# Patient Record
Sex: FEMALE | Race: WHITE | HISPANIC OR LATINO | URBAN - METROPOLITAN AREA
[De-identification: names, ages, dates, MRNs, and addresses within clinical notes are randomized per-mention and may not be internally consistent; named-entity substitution may affect disease eponyms.]

---

## 2017-02-18 ENCOUNTER — EMERGENCY (EMERGENCY)
Facility: HOSPITAL | Age: 37
LOS: 0 days | Discharge: HOME | End: 2017-02-19
Admitting: FAMILY MEDICINE

## 2017-06-27 DIAGNOSIS — F60.0 PARANOID PERSONALITY DISORDER: ICD-10-CM

## 2017-06-27 DIAGNOSIS — Z79.899 OTHER LONG TERM (CURRENT) DRUG THERAPY: ICD-10-CM

## 2017-06-27 DIAGNOSIS — R45.851 SUICIDAL IDEATIONS: ICD-10-CM

## 2017-06-27 DIAGNOSIS — E03.9 HYPOTHYROIDISM, UNSPECIFIED: ICD-10-CM

## 2017-06-27 DIAGNOSIS — Z90.89 ACQUIRED ABSENCE OF OTHER ORGANS: ICD-10-CM

## 2018-02-01 ENCOUNTER — INPATIENT (INPATIENT)
Facility: HOSPITAL | Age: 38
LOS: 8 days | Discharge: HOME | End: 2018-02-10
Attending: HOSPITALIST | Admitting: FAMILY MEDICINE

## 2018-02-03 VITALS — WEIGHT: 142.64 LBS | HEIGHT: 62 IN

## 2018-02-03 RX ORDER — ONDANSETRON 8 MG/1
4 TABLET, FILM COATED ORAL EVERY 6 HOURS
Qty: 0 | Refills: 0 | Status: DISCONTINUED | OUTPATIENT
Start: 2018-02-03 | End: 2018-02-10

## 2018-02-03 RX ORDER — MORPHINE SULFATE 50 MG/1
2 CAPSULE, EXTENDED RELEASE ORAL EVERY 6 HOURS
Qty: 0 | Refills: 0 | Status: DISCONTINUED | OUTPATIENT
Start: 2018-02-03 | End: 2018-02-04

## 2018-02-03 RX ORDER — ACETAMINOPHEN 500 MG
650 TABLET ORAL EVERY 6 HOURS
Qty: 0 | Refills: 0 | Status: DISCONTINUED | OUTPATIENT
Start: 2018-02-03 | End: 2018-02-10

## 2018-02-03 RX ORDER — LEVOTHYROXINE SODIUM 125 MCG
150 TABLET ORAL EVERY 24 HOURS
Qty: 0 | Refills: 0 | Status: DISCONTINUED | OUTPATIENT
Start: 2018-02-04 | End: 2018-02-10

## 2018-02-03 RX ORDER — ENOXAPARIN SODIUM 100 MG/ML
40 INJECTION SUBCUTANEOUS DAILY
Qty: 0 | Refills: 0 | Status: DISCONTINUED | OUTPATIENT
Start: 2018-02-03 | End: 2018-02-10

## 2018-02-03 RX ORDER — SIMETHICONE 80 MG/1
80 TABLET, CHEWABLE ORAL
Qty: 0 | Refills: 0 | Status: DISCONTINUED | OUTPATIENT
Start: 2018-02-03 | End: 2018-02-08

## 2018-02-03 RX ORDER — DOCUSATE SODIUM 100 MG
100 CAPSULE ORAL EVERY 8 HOURS
Qty: 0 | Refills: 0 | Status: DISCONTINUED | OUTPATIENT
Start: 2018-02-03 | End: 2018-02-10

## 2018-02-03 RX ADMIN — Medication 100 MILLIGRAM(S): at 23:08

## 2018-02-03 RX ADMIN — SIMETHICONE 80 MILLIGRAM(S): 80 TABLET, CHEWABLE ORAL at 23:10

## 2018-02-03 RX ADMIN — MORPHINE SULFATE 2 MILLIGRAM(S): 50 CAPSULE, EXTENDED RELEASE ORAL at 23:29

## 2018-02-04 DIAGNOSIS — K86.9 DISEASE OF PANCREAS, UNSPECIFIED: ICD-10-CM

## 2018-02-04 DIAGNOSIS — E03.9 HYPOTHYROIDISM, UNSPECIFIED: ICD-10-CM

## 2018-02-04 LAB
ANION GAP SERPL CALC-SCNC: 8 MMOL/L — SIGNIFICANT CHANGE UP (ref 7–14)
BUN SERPL-MCNC: 6 MG/DL — LOW (ref 10–20)
CALCIUM SERPL-MCNC: 9.2 MG/DL — SIGNIFICANT CHANGE UP (ref 8.5–10.1)
CHLORIDE SERPL-SCNC: 103 MMOL/L — SIGNIFICANT CHANGE UP (ref 98–110)
CO2 SERPL-SCNC: 27 MMOL/L — SIGNIFICANT CHANGE UP (ref 17–32)
CREAT SERPL-MCNC: 0.6 MG/DL — LOW (ref 0.7–1.5)
GLUCOSE SERPL-MCNC: 104 MG/DL — SIGNIFICANT CHANGE UP (ref 70–110)
HCT VFR BLD CALC: 44.7 % — SIGNIFICANT CHANGE UP (ref 37–47)
HGB BLD-MCNC: 15.1 G/DL — SIGNIFICANT CHANGE UP (ref 14–18)
MCHC RBC-ENTMCNC: 29.2 PG — SIGNIFICANT CHANGE UP (ref 27–31)
MCHC RBC-ENTMCNC: 33.8 G/DL — SIGNIFICANT CHANGE UP (ref 32–37)
MCV RBC AUTO: 86.3 FL — SIGNIFICANT CHANGE UP (ref 81–91)
NRBC # BLD: 0 /100 WBCS — SIGNIFICANT CHANGE UP (ref 0–0)
PLATELET # BLD AUTO: 242 K/UL — SIGNIFICANT CHANGE UP (ref 130–400)
POTASSIUM SERPL-MCNC: 3.8 MMOL/L — SIGNIFICANT CHANGE UP (ref 3.5–5)
POTASSIUM SERPL-SCNC: 3.8 MMOL/L — SIGNIFICANT CHANGE UP (ref 3.5–5)
RBC # BLD: 5.18 M/UL — SIGNIFICANT CHANGE UP (ref 4.2–5.4)
RBC # FLD: 12.8 % — SIGNIFICANT CHANGE UP (ref 11.5–14.5)
SODIUM SERPL-SCNC: 138 MMOL/L — SIGNIFICANT CHANGE UP (ref 135–146)
WBC # BLD: 8.92 K/UL — SIGNIFICANT CHANGE UP (ref 4.8–10.8)
WBC # FLD AUTO: 8.92 K/UL — SIGNIFICANT CHANGE UP (ref 4.8–10.8)

## 2018-02-04 RX ORDER — SODIUM CHLORIDE 9 MG/ML
1000 INJECTION, SOLUTION INTRAVENOUS
Qty: 0 | Refills: 0 | Status: DISCONTINUED | OUTPATIENT
Start: 2018-02-04 | End: 2018-02-04

## 2018-02-04 RX ORDER — MORPHINE SULFATE 50 MG/1
1 CAPSULE, EXTENDED RELEASE ORAL ONCE
Qty: 0 | Refills: 0 | Status: DISCONTINUED | OUTPATIENT
Start: 2018-02-04 | End: 2018-02-04

## 2018-02-04 RX ORDER — MORPHINE SULFATE 50 MG/1
2 CAPSULE, EXTENDED RELEASE ORAL EVERY 4 HOURS
Qty: 0 | Refills: 0 | Status: DISCONTINUED | OUTPATIENT
Start: 2018-02-04 | End: 2018-02-08

## 2018-02-04 RX ORDER — SODIUM CHLORIDE 9 MG/ML
1000 INJECTION, SOLUTION INTRAVENOUS
Qty: 0 | Refills: 0 | Status: DISCONTINUED | OUTPATIENT
Start: 2018-02-04 | End: 2018-02-10

## 2018-02-04 RX ADMIN — MORPHINE SULFATE 1 MILLIGRAM(S): 50 CAPSULE, EXTENDED RELEASE ORAL at 07:56

## 2018-02-04 RX ADMIN — Medication 100 MILLIGRAM(S): at 14:45

## 2018-02-04 RX ADMIN — Medication 100 MILLIGRAM(S): at 21:26

## 2018-02-04 RX ADMIN — MORPHINE SULFATE 1 MILLIGRAM(S): 50 CAPSULE, EXTENDED RELEASE ORAL at 08:00

## 2018-02-04 RX ADMIN — SIMETHICONE 80 MILLIGRAM(S): 80 TABLET, CHEWABLE ORAL at 12:36

## 2018-02-04 RX ADMIN — SIMETHICONE 80 MILLIGRAM(S): 80 TABLET, CHEWABLE ORAL at 05:35

## 2018-02-04 RX ADMIN — Medication 150 MICROGRAM(S): at 05:37

## 2018-02-04 RX ADMIN — Medication 100 MILLIGRAM(S): at 05:35

## 2018-02-04 RX ADMIN — MORPHINE SULFATE 2 MILLIGRAM(S): 50 CAPSULE, EXTENDED RELEASE ORAL at 08:00

## 2018-02-04 RX ADMIN — MORPHINE SULFATE 2 MILLIGRAM(S): 50 CAPSULE, EXTENDED RELEASE ORAL at 22:19

## 2018-02-04 RX ADMIN — SODIUM CHLORIDE 100 MILLILITER(S): 9 INJECTION, SOLUTION INTRAVENOUS at 13:01

## 2018-02-04 RX ADMIN — MORPHINE SULFATE 2 MILLIGRAM(S): 50 CAPSULE, EXTENDED RELEASE ORAL at 07:48

## 2018-02-04 RX ADMIN — SIMETHICONE 80 MILLIGRAM(S): 80 TABLET, CHEWABLE ORAL at 18:14

## 2018-02-04 NOTE — PROGRESS NOTE ADULT - ASSESSMENT
38 yo F with PMH of Hypothyroidism being followed by GI for epigastric pain and found to have a hypodense lesion in the body of the pancreas with atrophy of the pancreatic tail. ( Necrosis vs Neoplasm )    - NPO, Pain control, IV fluids LR @ 150 cc/hr.  - will need EUS +/- FNA, FNB of the pancreatic lesion.  - will discuss with biliary team for further management.  - Send IgG4 levels.  - will follow up

## 2018-02-04 NOTE — PROGRESS NOTE ADULT - SUBJECTIVE AND OBJECTIVE BOX
Pt being followed by GI for upper abdominal pain and has a lesion in the body of pancreas with atrophy of the pancreatic tail. ( Necrosis vs Neoplasm )    Interval Events: Pt c/o epigastric pain, tolerating clears.     Allergies:  No Known Allergies      Hospital Medications:  acetaminophen   Tablet. 650 milliGRAM(s) Oral every 6 hours PRN  dextrose 5% + sodium chloride 0.9%. 1000 milliLiter(s) IV Continuous <Continuous>  docusate sodium 100 milliGRAM(s) Oral every 8 hours  enoxaparin Injectable 40 milliGRAM(s) SubCutaneous daily  levothyroxine 150 MICROGram(s) Oral every 24 hours  morphine  - Injectable 2 milliGRAM(s) IV Push every 6 hours PRN  ondansetron Injectable 4 milliGRAM(s) IV Push every 6 hours PRN  simethicone 80 milliGRAM(s) Chew four times a day  sodium biphosphate Rectal Enema 1 Enema Rectal once      PMHX/PSHX:      Family history:      ROS:     General:  No fevers, chills, night sweats, fatigue,   Eyes:  Good vision, no reported pain  ENT:  No sore throat, pain, runny nose, dysphagia  CV:  No pain, palpitations, hypo/hypertension  Resp:  No dyspnea, cough, tachypnea, wheezing  GI:  See HPI  :  No pain, bleeding, incontinence, nocturia  Muscle:  No pain, weakness  Neuro:  No weakness, tingling, memory problems  Endocrine:  No polyuria, polydipsia, cold/heat intolerance  Heme:  No petechiae, ecchymosis, easy bruisability  Skin:  No rash, edema      PHYSICAL EXAM:     GENERAL:  Appears stated age, well-groomed, well-nourished, no distress  HEENT:  NC/AT,  conjunctivae clear, sclera -anicteric  CHEST:  Full & symmetric excursion, no increased effort, breath sounds clear  HEART:  Regular rhythm, S1, S2, no murmur/rub/S3/S4,  no edema  ABDOMEN:  Soft, mild tenderness in the epigastrium, normoactive bowel sounds.  EXTREMITIES:  no cyanosis, clubbing or edema  SKIN:  No rash/erythema/ecchymoses/petechiae/wounds/abscess/warm/dry  NEURO:  Alert, oriented, non focal.    Vital Signs:  Vital Signs Last 24 Hrs  T(C): --  T(F): --  HR: --  BP: --  BP(mean): --  RR: --  SpO2: --  Daily Height in cm: 157.48 (03 Feb 2018 10:00)    Daily     LABS:    02-04    138  |  103  |  6<L>  ----------------------------<  104  3.8   |  27  |  0.6<L>    Ca    9.2      04 Feb 2018 05:54                Imaging:   < from: MR Abdomen w/wo IV Cont (01.01.06 @ 00:00) >  Impression:  1. Pancreatic body 5 cm hypovascular lesion with upstream atrophy of the   pancreatic tail, correlating with CT. Neoplasm cannot be excluded in this   37-year-old patient, however pancreatic necrosis is still a consideration.    2. Pancreatic lesion encases portal vein at portosplenic confluence, with   attenuation/contusion of splenic vein.     3. Liver segment 7 hypervascular focus measuring 1.3 cm; possibly FNH.      < end of copied text >

## 2018-02-04 NOTE — PROGRESS NOTE ADULT - SUBJECTIVE AND OBJECTIVE BOX
VICENTE TAVERAS [id number]    Hospitalist Note  37y Female with Past Medical History Hypothyroidism admitted for abdominal pain r/o pancreatic CA.    Overnight events/Updates: status post MRCP of the Abdomen/Pelvis.  The pt complains of persistent abdominal tenderness associated with oral intake.     Vital Signs Last 24 Hrs  T(F): 98.4  HR: 81  BP: 118/75  RR: 18  SpO2: --    Physical Examination:  General: AAO x3   HEENT: PERRLA, EOMI  CV= S1 & S2 appreciated  Lungs= CTA BL  Abdominal Examination= + BS, +epigastric tenderness  Extremity Examination= No C/C/E      02-04    138  |  103  |  6<L>  ----------------------------<  104  3.8   |  27  |  0.6<L>    Ca    9.2      04 Feb 2018 05:54      Case discussed with housestaff & family  SARAH Corey [id number]

## 2018-02-04 NOTE — PROGRESS NOTE ADULT - PROBLEM SELECTOR PLAN 1
Status post MRCP.  Findings did not differentiate lesion between mass & cystic structure.  Follow-up with Gastroenterology--Dr. Falk to schedule EUS.  For persistent pain, continue clear liquid diet and start aggressive IV hydration

## 2018-02-05 LAB
ANION GAP SERPL CALC-SCNC: 7 MMOL/L — SIGNIFICANT CHANGE UP (ref 7–14)
BUN SERPL-MCNC: 5 MG/DL — LOW (ref 10–20)
CALCIUM SERPL-MCNC: 8.9 MG/DL — SIGNIFICANT CHANGE UP (ref 8.5–10.1)
CHLORIDE SERPL-SCNC: 103 MMOL/L — SIGNIFICANT CHANGE UP (ref 98–110)
CO2 SERPL-SCNC: 28 MMOL/L — SIGNIFICANT CHANGE UP (ref 17–32)
CREAT SERPL-MCNC: 0.5 MG/DL — LOW (ref 0.7–1.5)
GLUCOSE SERPL-MCNC: 99 MG/DL — SIGNIFICANT CHANGE UP (ref 70–110)
HCT VFR BLD CALC: 42 % — SIGNIFICANT CHANGE UP (ref 37–47)
HGB BLD-MCNC: 14.2 G/DL — SIGNIFICANT CHANGE UP (ref 14–18)
MCHC RBC-ENTMCNC: 29.1 PG — SIGNIFICANT CHANGE UP (ref 27–31)
MCHC RBC-ENTMCNC: 33.8 G/DL — SIGNIFICANT CHANGE UP (ref 32–37)
MCV RBC AUTO: 86.1 FL — SIGNIFICANT CHANGE UP (ref 81–91)
NRBC # BLD: 0 /100 WBCS — SIGNIFICANT CHANGE UP (ref 0–0)
PLATELET # BLD AUTO: 199 K/UL — SIGNIFICANT CHANGE UP (ref 130–400)
POTASSIUM SERPL-MCNC: 3.9 MMOL/L — SIGNIFICANT CHANGE UP (ref 3.5–5)
POTASSIUM SERPL-SCNC: 3.9 MMOL/L — SIGNIFICANT CHANGE UP (ref 3.5–5)
RBC # BLD: 4.88 M/UL — SIGNIFICANT CHANGE UP (ref 4.2–5.4)
RBC # FLD: 12.7 % — SIGNIFICANT CHANGE UP (ref 11.5–14.5)
SODIUM SERPL-SCNC: 138 MMOL/L — SIGNIFICANT CHANGE UP (ref 135–146)
WBC # BLD: 6.59 K/UL — SIGNIFICANT CHANGE UP (ref 4.8–10.8)
WBC # FLD AUTO: 6.59 K/UL — SIGNIFICANT CHANGE UP (ref 4.8–10.8)

## 2018-02-05 RX ORDER — TRAMADOL HYDROCHLORIDE 50 MG/1
25 TABLET ORAL THREE TIMES A DAY
Qty: 0 | Refills: 0 | Status: DISCONTINUED | OUTPATIENT
Start: 2018-02-05 | End: 2018-02-06

## 2018-02-05 RX ORDER — POLYETHYLENE GLYCOL 3350 17 G/17G
17 POWDER, FOR SOLUTION ORAL DAILY
Qty: 0 | Refills: 0 | Status: DISCONTINUED | OUTPATIENT
Start: 2018-02-05 | End: 2018-02-10

## 2018-02-05 RX ADMIN — TRAMADOL HYDROCHLORIDE 25 MILLIGRAM(S): 50 TABLET ORAL at 14:53

## 2018-02-05 RX ADMIN — SIMETHICONE 80 MILLIGRAM(S): 80 TABLET, CHEWABLE ORAL at 12:51

## 2018-02-05 RX ADMIN — POLYETHYLENE GLYCOL 3350 17 GRAM(S): 17 POWDER, FOR SOLUTION ORAL at 16:54

## 2018-02-05 RX ADMIN — Medication 100 MILLIGRAM(S): at 12:51

## 2018-02-05 RX ADMIN — Medication 150 MICROGRAM(S): at 05:19

## 2018-02-05 RX ADMIN — Medication 650 MILLIGRAM(S): at 14:48

## 2018-02-05 RX ADMIN — Medication 100 MILLIGRAM(S): at 05:20

## 2018-02-05 RX ADMIN — SODIUM CHLORIDE 150 MILLILITER(S): 9 INJECTION, SOLUTION INTRAVENOUS at 08:51

## 2018-02-05 RX ADMIN — TRAMADOL HYDROCHLORIDE 25 MILLIGRAM(S): 50 TABLET ORAL at 14:49

## 2018-02-05 RX ADMIN — SODIUM CHLORIDE 150 MILLILITER(S): 9 INJECTION, SOLUTION INTRAVENOUS at 16:36

## 2018-02-05 RX ADMIN — SIMETHICONE 80 MILLIGRAM(S): 80 TABLET, CHEWABLE ORAL at 05:47

## 2018-02-05 RX ADMIN — Medication 100 MILLIGRAM(S): at 21:29

## 2018-02-05 RX ADMIN — Medication 10 MILLIGRAM(S): at 17:01

## 2018-02-05 RX ADMIN — Medication 650 MILLIGRAM(S): at 09:43

## 2018-02-05 RX ADMIN — SIMETHICONE 80 MILLIGRAM(S): 80 TABLET, CHEWABLE ORAL at 04:33

## 2018-02-05 NOTE — PROGRESS NOTE ADULT - ASSESSMENT
Patient is a 38 y/o female with Past Medical History of Hypothyroid, that presented to [facility] with complaints of Abdominal Pain found to have abnormal imaging in this case a 5cm area on the head of her Pancrease with downstream Atrophy. Patient notes that her current abdominal pain is improved and has an appetite.     1) Abdominal Pain/ abnormal imaging  - CMP, CBC, agree with Igg4 levels  - Consider EUS +/- FNA of suspicious area  - Will review MRI imaging with Radiology today   - If without pain can consider work up as an outpatient  - for any fever, WBC elevation, or worsening of abdominal pain noitfy advanced GI immediately- [id number]  - consider Miralax for constipation, likely causing occasional scant blood in stool, consider Endoscopic work up as outpatient (i.e Colonoscopy)

## 2018-02-05 NOTE — PROGRESS NOTE ADULT - ASSESSMENT
Abdominal Pain r/o pancreatic CA:   -MRCP result with pancreatic lesion , neoplasm vs pancreatic necrosis.   -Awaiting GI recommendations for possible EUS.  -continue IVF, clear diet for now  -pain control     Chronic Constipation  -had a BM yesterday, on laxatives  - reported streaks of blood  -outpatient workup with colonoscopy    Hypothyroidism: Continue Synthroid as directed.    GI/DVT prophylaxis

## 2018-02-05 NOTE — PROGRESS NOTE ADULT - SUBJECTIVE AND OBJECTIVE BOX
Patient is a 38 y/o female with Past Medical History of Hypothyroid, that presented to [facility] with complaints of Abdominal Pain. Patient notes pain when it occurred was located in various sites of the abdomen ( diffuse) now located more on the LUQ and LLQ. Pain was sharp at times and constant to the point that she became concerned. She notes pain much better since admission. Patient along with abdominal pain notes constipation and when she passes passes stool has noted occasional blood. Patient denies to me family history of Pancreatic or Liver disease nor chronic abdominal pain, nor ever being told that she has had an issue with her Pancreas. Currently patient admits to mild diffuse abdominal pain 2-3/10. Patient denies fever, chills, sweats, weight loss/gain, change in color of skin, black stools, nausea/vomiting/inability to tolerate meals, recent ABX use, chest pain, SOB, palpitations, trouble breathing, headache, recent sick exposure, or recent travel.     Vital Signs Last 24 Hrs:  T(C): 36.6 (05 Feb 2018 05:23), Max: 37.1 (04 Feb 2018 21:06)  T(F): 97.9 (05 Feb 2018 05:23), Max: 98.8 (04 Feb 2018 21:06)  HR: 77 (05 Feb 2018 05:23) (77 - 87)  BP: 99/59 (05 Feb 2018 05:23) (99/59 - 122/73)  Gen: NAD, comfortable   HEENT: NC/AT, Mucosal Membranes moist  Cardio: S1/S2 No S3/S4, Regular  Resp: CTA B/L  Abdomen: Soft, ND/NT, no guarding, no rebound tenderness  Neuro: AAOx3, Cranial Nerve II-XII intact   Extremities: FROM x 4    02-04    138  |  103  |  6<L>  ----------------------------<  104  3.8   |  27  |  0.6<L>    Ca    9.2      04 Feb 2018 05:54                          15.1   8.92  )-----------( 242      ( 04 Feb 2018 05:54 )             44.7     MRI/MRCP;  1) Pancreatic Body 5cm Hypovascular lesion with upstream Pancreatic Atrophy  2) Pancreatic lesion encases Portal Vein at Portosplenic confluence   3) Liver 1.7 Hypervascular focus-may be FNH

## 2018-02-05 NOTE — PROGRESS NOTE ADULT - SUBJECTIVE AND OBJECTIVE BOX
Patient is a 37y old  Female who presents with a chief complaint of abdominal pain. She reports continuous abdominal pain, relieved by morphine. no nausea or vomiting. no fever. She is still on clears and tolerating.  She had a BM yesterday after the fleet enema.    Social: (-) tobacco, alcohol, drug use    MEDICATIONS  (STANDING):  docusate sodium 100 milliGRAM(s) Oral every 8 hours  enoxaparin Injectable 40 milliGRAM(s) SubCutaneous daily  lactated ringers. 1000 milliLiter(s) (150 mL/Hr) IV Continuous <Continuous>  levothyroxine 150 MICROGram(s) Oral every 24 hours  simethicone 80 milliGRAM(s) Chew four times a day  sodium biphosphate Rectal Enema 1 Enema Rectal once    MEDICATIONS  (PRN):  acetaminophen   Tablet. 650 milliGRAM(s) Oral every 6 hours PRN Moderate Pain (4 - 6)  morphine  - Injectable 2 milliGRAM(s) IV Push every 4 hours PRN Severe Pain (7 - 10)  ondansetron Injectable 4 milliGRAM(s) IV Push every 6 hours PRN Nausea and/or Vomiting      Overnight events: No events     Vital Signs Last 24 Hrs  T(C): 36.6 (05 Feb 2018 05:23), Max: 37.1 (04 Feb 2018 21:06)  T(F): 97.9 (05 Feb 2018 05:23), Max: 98.8 (04 Feb 2018 21:06)  HR: 77 (05 Feb 2018 05:23) (77 - 87)  BP: 99/59 (05 Feb 2018 05:23) (99/59 - 122/73)  BP(mean): --  RR: 20 (05 Feb 2018 05:23) (18 - 20)  SpO2: --  CAPILLARY BLOOD GLUCOSE        I&O's Summary    04 Feb 2018 07:01  -  05 Feb 2018 07:00  --------------------------------------------------------  IN: 320 mL / OUT: 0 mL / NET: 320 mL    05 Feb 2018 07:01  -  05 Feb 2018 11:25  --------------------------------------------------------  IN: 300 mL / OUT: 0 mL / NET: 300 mL        Physical Exam:    -     General : In no acute distress    -      Cardiac: RRR, no added sounds    -      Pulm: GBAE, clear lungs    -      GI: abdomen soft, no tenderness        Labs:                        14.2   6.59  )-----------( 199      ( 05 Feb 2018 08:37 )             42.0             02-04    138  |  103  |  6<L>  ----------------------------<  104  3.8   |  27  |  0.6<L>    Ca    9.2      04 Feb 2018 05:54

## 2018-02-05 NOTE — PROGRESS NOTE ADULT - ASSESSMENT
37y Female with Past Medical History Hypothyroidism admitted for abdominal pain r/o pancreatic CA.    Abdominal Pain r/o pancreatic CA: Gastroenterology recommendations were reviewed.  MRCP did not clearly delineate malignancy vs. collection/inflammation.  The patient may benefit from further investigation with possible EUS.  Will need to clarify procedure with gastroenterology--Dr. Falk prior to discharge.  The pt also remains symptomatic with mild distension and persistent discomfort.  Continuation treatment with aggressive IV hydration for an additional 24 hours with clear liquid diet.  Tylenol PRN for pain with Morphine PRN for breakthrough pain.  Hypothyroidism: Continue Synthroid as directed.   GI/DVT prophylaxis

## 2018-02-05 NOTE — PROGRESS NOTE ADULT - SUBJECTIVE AND OBJECTIVE BOX
VICENTE TAVERAS [id number]    Hospitalist Note  37y Female with Past Medical History Hypothyroidism admitted for abdominal pain r/o pancreatic CA.    Overnight events/Updates: The pt complained of persistent abdominal discomfort yesterday afternoon.  We continued a clear liquid diet, but started aggressive IV hydration with LR for suspected pancreatic inflammation.   MRCP was inconclusive.  We are awaiting GI to make a decision re: further imaging/procedures.    Vital Signs Last 24 Hrs  T(C): 36.6 (05 Feb 2018 05:23), Max: 37.1 (04 Feb 2018 21:06)  T(F): 97.9 (05 Feb 2018 05:23), Max: 98.8 (04 Feb 2018 21:06)  HR: 77 (05 Feb 2018 05:23) (77 - 87)  BP: 99/59 (05 Feb 2018 05:23) (99/59 - 122/73)  BP(mean): --  RR: 20 (05 Feb 2018 05:23) (18 - 20)  SpO2: --    Physical Examination:  General: AAO x 3  HEENT: PERRLA, EOMI  CV= S1 & S2 appreciated  Lungs= CTA BL  Abdominal Examination= + BS, Soft, mild distension,  + pain epigastric tenderness  Extremity Examination= No C/C/E    ROS: No chest pain, no shortness of breath.  The pt complains of constipation  All other systems reviewed and are wtihin normal limits except for the complaints in the HPI.    MEDICATIONS  (STANDING):  docusate sodium 100 milliGRAM(s) Oral every 8 hours  enoxaparin Injectable 40 milliGRAM(s) SubCutaneous daily  lactated ringers. 1000 milliLiter(s) (150 mL/Hr) IV Continuous <Continuous>  levothyroxine 150 MICROGram(s) Oral every 24 hours  simethicone 80 milliGRAM(s) Chew four times a day  sodium biphosphate Rectal Enema 1 Enema Rectal once    MEDICATIONS  (PRN):  acetaminophen   Tablet. 650 milliGRAM(s) Oral every 6 hours PRN Moderate Pain (4 - 6)  morphine  - Injectable 2 milliGRAM(s) IV Push every 4 hours PRN Severe Pain (7 - 10)  ondansetron Injectable 4 milliGRAM(s) IV Push every 6 hours PRN Nausea and/or Vomiting                            15.1   8.92  )-----------( 242      ( 04 Feb 2018 05:54 )             44.7     02-04    138  |  103  |  6<L>  ----------------------------<  104  3.8   |  27  |  0.6<L>    Ca    9.2      04 Feb 2018 05:54        Case discussed with housestaff & family  SARAH Corey [id number]

## 2018-02-06 LAB
ALBUMIN SERPL ELPH-MCNC: 3.8 G/DL — SIGNIFICANT CHANGE UP (ref 3–5.5)
ALP SERPL-CCNC: 32 U/L — SIGNIFICANT CHANGE UP (ref 30–115)
ALT FLD-CCNC: 18 U/L — SIGNIFICANT CHANGE UP (ref 0–41)
ANION GAP SERPL CALC-SCNC: 4 MMOL/L — LOW (ref 7–14)
APTT BLD: 29.2 SEC — SIGNIFICANT CHANGE UP (ref 27–39.2)
AST SERPL-CCNC: 16 U/L — SIGNIFICANT CHANGE UP (ref 0–41)
BASOPHILS # BLD AUTO: 0.07 K/UL — SIGNIFICANT CHANGE UP (ref 0–0.2)
BASOPHILS NFR BLD AUTO: 1.2 % — HIGH (ref 0–1)
BILIRUB DIRECT SERPL-MCNC: 0.2 MG/DL — SIGNIFICANT CHANGE UP (ref 0–0.2)
BILIRUB INDIRECT FLD-MCNC: 0.8 MG/DL — SIGNIFICANT CHANGE UP
BILIRUB SERPL-MCNC: 1 MG/DL — SIGNIFICANT CHANGE UP (ref 0.2–1.2)
BUN SERPL-MCNC: 6 MG/DL — LOW (ref 10–20)
CALCIUM SERPL-MCNC: 9.1 MG/DL — SIGNIFICANT CHANGE UP (ref 8.5–10.1)
CHLORIDE SERPL-SCNC: 107 MMOL/L — SIGNIFICANT CHANGE UP (ref 98–110)
CO2 SERPL-SCNC: 29 MMOL/L — SIGNIFICANT CHANGE UP (ref 17–32)
CREAT SERPL-MCNC: 0.6 MG/DL — LOW (ref 0.7–1.5)
EOSINOPHIL # BLD AUTO: 0.11 K/UL — SIGNIFICANT CHANGE UP (ref 0–0.7)
EOSINOPHIL NFR BLD AUTO: 1.9 % — SIGNIFICANT CHANGE UP (ref 0–8)
GLUCOSE SERPL-MCNC: 91 MG/DL — SIGNIFICANT CHANGE UP (ref 70–110)
HCT VFR BLD CALC: 40.4 % — SIGNIFICANT CHANGE UP (ref 37–47)
HGB BLD-MCNC: 13.7 G/DL — LOW (ref 14–18)
IMM GRANULOCYTES NFR BLD AUTO: 0.3 % — SIGNIFICANT CHANGE UP (ref 0.1–0.3)
INR BLD: 1.3 RATIO — SIGNIFICANT CHANGE UP (ref 0.65–1.3)
LIDOCAIN IGE QN: 18 U/L — SIGNIFICANT CHANGE UP (ref 7–60)
LYMPHOCYTES # BLD AUTO: 2.22 K/UL — SIGNIFICANT CHANGE UP (ref 1.2–3.4)
LYMPHOCYTES # BLD AUTO: 38.3 % — SIGNIFICANT CHANGE UP (ref 20.5–51.1)
MCHC RBC-ENTMCNC: 29 PG — SIGNIFICANT CHANGE UP (ref 27–31)
MCHC RBC-ENTMCNC: 33.9 G/DL — SIGNIFICANT CHANGE UP (ref 32–37)
MCV RBC AUTO: 85.6 FL — SIGNIFICANT CHANGE UP (ref 81–91)
MONOCYTES # BLD AUTO: 0.38 K/UL — SIGNIFICANT CHANGE UP (ref 0.1–0.6)
MONOCYTES NFR BLD AUTO: 6.6 % — SIGNIFICANT CHANGE UP (ref 1.7–9.3)
NEUTROPHILS # BLD AUTO: 3 K/UL — SIGNIFICANT CHANGE UP (ref 1.4–6.5)
NEUTROPHILS NFR BLD AUTO: 51.7 % — SIGNIFICANT CHANGE UP (ref 42.2–75.2)
NRBC # BLD: 0 /100 WBCS — SIGNIFICANT CHANGE UP (ref 0–0)
PLATELET # BLD AUTO: 207 K/UL — SIGNIFICANT CHANGE UP (ref 130–400)
POTASSIUM SERPL-MCNC: 3.8 MMOL/L — SIGNIFICANT CHANGE UP (ref 3.5–5)
POTASSIUM SERPL-SCNC: 3.8 MMOL/L — SIGNIFICANT CHANGE UP (ref 3.5–5)
PROT SERPL-MCNC: 5.6 G/DL — LOW (ref 6–8)
PROTHROM AB SERPL-ACNC: 14.1 SEC — HIGH (ref 9.95–12.87)
RBC # BLD: 4.72 M/UL — SIGNIFICANT CHANGE UP (ref 4.2–5.4)
RBC # FLD: 12.6 % — SIGNIFICANT CHANGE UP (ref 11.5–14.5)
SODIUM SERPL-SCNC: 140 MMOL/L — SIGNIFICANT CHANGE UP (ref 135–146)
WBC # BLD: 5.8 K/UL — SIGNIFICANT CHANGE UP (ref 4.8–10.8)
WBC # FLD AUTO: 5.8 K/UL — SIGNIFICANT CHANGE UP (ref 4.8–10.8)

## 2018-02-06 RX ORDER — MEPERIDINE HYDROCHLORIDE 50 MG/ML
25 INJECTION INTRAMUSCULAR; INTRAVENOUS; SUBCUTANEOUS ONCE
Qty: 0 | Refills: 0 | Status: DISCONTINUED | OUTPATIENT
Start: 2018-02-06 | End: 2018-02-06

## 2018-02-06 RX ORDER — TRAMADOL HYDROCHLORIDE 50 MG/1
50 TABLET ORAL
Qty: 0 | Refills: 0 | Status: DISCONTINUED | OUTPATIENT
Start: 2018-02-06 | End: 2018-02-10

## 2018-02-06 RX ADMIN — MORPHINE SULFATE 2 MILLIGRAM(S): 50 CAPSULE, EXTENDED RELEASE ORAL at 21:58

## 2018-02-06 RX ADMIN — TRAMADOL HYDROCHLORIDE 50 MILLIGRAM(S): 50 TABLET ORAL at 12:40

## 2018-02-06 RX ADMIN — TRAMADOL HYDROCHLORIDE 25 MILLIGRAM(S): 50 TABLET ORAL at 00:26

## 2018-02-06 RX ADMIN — MORPHINE SULFATE 2 MILLIGRAM(S): 50 CAPSULE, EXTENDED RELEASE ORAL at 22:05

## 2018-02-06 RX ADMIN — MEPERIDINE HYDROCHLORIDE 25 MILLIGRAM(S): 50 INJECTION INTRAMUSCULAR; INTRAVENOUS; SUBCUTANEOUS at 16:39

## 2018-02-06 RX ADMIN — TRAMADOL HYDROCHLORIDE 50 MILLIGRAM(S): 50 TABLET ORAL at 19:00

## 2018-02-06 RX ADMIN — Medication 150 MICROGRAM(S): at 06:13

## 2018-02-06 RX ADMIN — TRAMADOL HYDROCHLORIDE 50 MILLIGRAM(S): 50 TABLET ORAL at 19:02

## 2018-02-06 RX ADMIN — POLYETHYLENE GLYCOL 3350 17 GRAM(S): 17 POWDER, FOR SOLUTION ORAL at 18:56

## 2018-02-06 RX ADMIN — MEPERIDINE HYDROCHLORIDE 25 MILLIGRAM(S): 50 INJECTION INTRAMUSCULAR; INTRAVENOUS; SUBCUTANEOUS at 17:37

## 2018-02-06 RX ADMIN — TRAMADOL HYDROCHLORIDE 50 MILLIGRAM(S): 50 TABLET ORAL at 12:39

## 2018-02-06 RX ADMIN — Medication 100 MILLIGRAM(S): at 22:02

## 2018-02-06 RX ADMIN — Medication 10 MILLIGRAM(S): at 12:40

## 2018-02-06 RX ADMIN — SIMETHICONE 80 MILLIGRAM(S): 80 TABLET, CHEWABLE ORAL at 12:40

## 2018-02-06 RX ADMIN — Medication 10 MILLIGRAM(S): at 06:14

## 2018-02-06 RX ADMIN — SIMETHICONE 80 MILLIGRAM(S): 80 TABLET, CHEWABLE ORAL at 05:41

## 2018-02-06 NOTE — PROGRESS NOTE ADULT - ASSESSMENT
38 y/o female presenting with abdominal Pain and chronic constipation found to have a 5cm area on the head of her Pancrease with downstream Atrophy on MRCP.    # Abdominal Pain/ 5cm pancreatic mass vs necrosis  R/o pancreatic tumor vs autoimmune pancreatitis  Pain could be secondary to chronic constipation   CA 19-9 was slightly elevated from baseline  -  Igg4 levels pending  -  EUS +/- FNA of suspicious area today.  - for any fever, WBC elevation, or worsening of abdominal pain, please notify advanced GI immediately- [id number]  -Pain control with Tramadol, continue IV hydration    # Chronic constipation   -continue with miralax, colace  -outpatient colonoscopy    #Hypothyroidism on Levothyroxine    #DVT prophylaxis

## 2018-02-06 NOTE — PROGRESS NOTE ADULT - SUBJECTIVE AND OBJECTIVE BOX
Patient is a 37y old  Female who presents with a chief complaint of     PAST MEDICAL & SURGICAL HISTORY:    FAMILY HISTORY:      Social: (-) tobacco, alcohol, drug use    MEDICATIONS  (STANDING):  dicyclomine 10 milliGRAM(s) Oral three times a day before meals  docusate sodium 100 milliGRAM(s) Oral every 8 hours  enoxaparin Injectable 40 milliGRAM(s) SubCutaneous daily  lactated ringers. 1000 milliLiter(s) (150 mL/Hr) IV Continuous <Continuous>  levothyroxine 150 MICROGram(s) Oral every 24 hours  polyethylene glycol 3350 17 Gram(s) Oral daily  simethicone 80 milliGRAM(s) Chew four times a day  sodium biphosphate Rectal Enema 1 Enema Rectal once    MEDICATIONS  (PRN):  acetaminophen   Tablet. 650 milliGRAM(s) Oral every 6 hours PRN Moderate Pain (4 - 6)  morphine  - Injectable 2 milliGRAM(s) IV Push every 4 hours PRN Severe Pain (7 - 10)  ondansetron Injectable 4 milliGRAM(s) IV Push every 6 hours PRN Nausea and/or Vomiting  traMADol 50 milliGRAM(s) Oral four times a day PRN Moderate Pain (4 - 6)      Overnight events:    Vital Signs Last 24 Hrs  T(C): 35.8 (06 Feb 2018 05:31), Max: 36.5 (05 Feb 2018 12:43)  T(F): 96.4 (06 Feb 2018 05:31), Max: 97.7 (05 Feb 2018 12:43)  HR: 75 (06 Feb 2018 05:31) (75 - 86)  BP: 110/60 (06 Feb 2018 05:31) (110/60 - 124/-)  BP(mean): --  RR: 20 (06 Feb 2018 05:31) (20 - 20)  SpO2: --  CAPILLARY BLOOD GLUCOSE        I&O's Summary    05 Feb 2018 07:01  -  06 Feb 2018 07:00  --------------------------------------------------------  IN: 300 mL / OUT: 0 mL / NET: 300 mL        Physical Exam:    -     General : In no acute distress    -      Cardiac: RRR , no added sounds    -      Pulm: GBAE, clear lungs    -      GI: soft abdomen, positive BS< no tenderness     -      Musculoskeletal: normal ROM , no joint tenderness    -      Neuro: AAOx3, no focal neurologic deficit        Labs:                        14.2   6.59  )-----------( 199      ( 05 Feb 2018 08:37 )             42.0             02-05    138  |  103  |  5<L>  ----------------------------<  99  3.9   |  28  |  0.5<L>    Ca    8.9      05 Feb 2018 08:37                            Imaging:    ECG: Patient is a 37y old  Female who presents with a chief complaint of abdominal pain and constipation.    PFH: idiopathic pancreatitis (mother)    Social: (-) tobacco, alcohol, drug use    MEDICATIONS  (STANDING):  dicyclomine 10 milliGRAM(s) Oral three times a day before meals  docusate sodium 100 milliGRAM(s) Oral every 8 hours  enoxaparin Injectable 40 milliGRAM(s) SubCutaneous daily  lactated ringers. 1000 milliLiter(s) (150 mL/Hr) IV Continuous <Continuous>  levothyroxine 150 MICROGram(s) Oral every 24 hours  polyethylene glycol 3350 17 Gram(s) Oral daily  simethicone 80 milliGRAM(s) Chew four times a day  sodium biphosphate Rectal Enema 1 Enema Rectal once    MEDICATIONS  (PRN):  acetaminophen   Tablet. 650 milliGRAM(s) Oral every 6 hours PRN Moderate Pain (4 - 6)  morphine  - Injectable 2 milliGRAM(s) IV Push every 4 hours PRN Severe Pain (7 - 10)  ondansetron Injectable 4 milliGRAM(s) IV Push every 6 hours PRN Nausea and/or Vomiting  traMADol 50 milliGRAM(s) Oral four times a day PRN Moderate Pain (4 - 6)      Overnight events: patient still complaining of mild to moderate abdominal pain. no fever.    Vital Signs Last 24 Hrs  T(C): 35.8 (06 Feb 2018 05:31), Max: 36.5 (05 Feb 2018 12:43)  T(F): 96.4 (06 Feb 2018 05:31), Max: 97.7 (05 Feb 2018 12:43)  HR: 75 (06 Feb 2018 05:31) (75 - 86)  BP: 110/60 (06 Feb 2018 05:31) (110/60 - 124/-)  BP(mean): --  RR: 20 (06 Feb 2018 05:31) (20 - 20)  SpO2: --  CAPILLARY BLOOD GLUCOSE        I&O's Summary    05 Feb 2018 07:01  -  06 Feb 2018 07:00  --------------------------------------------------------  IN: 300 mL / OUT: 0 mL / NET: 300 mL        Physical Exam:    -     General : In no acute distress    -      Cardiac: RRR , no added sounds    -      Pulm: GBAE, clear lungs    -      GI: soft abdomen, positive BS< no tenderness     -      Musculoskeletal: normal ROM , no joint tenderness    -      Neuro: AAOx3, no focal neurologic deficit        Labs:                        14.2   6.59  )-----------( 199      ( 05 Feb 2018 08:37 )             42.0             02-05    138  |  103  |  5<L>  ----------------------------<  99  3.9   |  28  |  0.5<L>    Ca    8.9      05 Feb 2018 08:37

## 2018-02-06 NOTE — DIETITIAN INITIAL EVALUATION ADULT. - ENERGY NEEDS
Estimated Calorie Needs: 2359-0372 kcal/day (MSJ x 1.2-1.3 AF)   Estimated Protein Needs: 65-78 gm/day (1-1.2 gm/kg ABW)   Estimated Fluid Needs:1164-1237 ml/day (1ml/kcal)

## 2018-02-06 NOTE — CONSULT NOTE ADULT - ASSESSMENT
37 M WITH PANCREATIC HEAD MASS    -AWAIT RESULTS OF EUS 37 M WITH PANCREATIC BODY MASS    -GI FOR EUS  -TUMOR MARKERS

## 2018-02-06 NOTE — DIETITIAN INITIAL EVALUATION ADULT. - OTHER INFO
Pt w/ PMHx of hypothyroidism c/o persistent abdominal tenderness associated w/ oral intake s/p MRCP of abdomen/pelvis. GI following. Pt has no N/V/D, c/o constipation.

## 2018-02-06 NOTE — PROGRESS NOTE ADULT - SUBJECTIVE AND OBJECTIVE BOX
VICENTE TAVERAS [id number]    Hospitalist Note  37y Female with Past Medical History Hypothyroidism admitted for abdominal pain r/o pancreatic CA vs. pancreatitis    Overnight events/Updates: the pt complains of persistent discomfort and bloating with oral intake, though was able to tolerate a plain salad.  Her case was discussed with GI (advanced)-Dr. Falk who has scheduled an EUS for later this morning.     Vital Signs Last 24 Hrs  T(C): 35.8 (06 Feb 2018 05:31), Max: 36.5 (05 Feb 2018 12:43)  T(F): 96.4 (06 Feb 2018 05:31), Max: 97.7 (05 Feb 2018 12:43)  HR: 75 (06 Feb 2018 05:31) (75 - 86)  BP: 110/60 (06 Feb 2018 05:31) (110/60 - 124/-)  BP(mean): --  RR: 20 (06 Feb 2018 05:31) (20 - 20)  SpO2: --    Physical Examination:  General: AAO x 3  HEENT: PERRLA, EOMI  CV= S1 & S2 appreciated  Lungs= CTA BL  Abdominal Examination= + BS, mild to moderate distension  Extremity Examination= No C/C/E    ROS: No chest pain, no shortness of breath.  All other systems reviewed and are within normal limits except for the complaints in the HPI.    MEDICATIONS  (STANDING):  dicyclomine 10 milliGRAM(s) Oral three times a day before meals  docusate sodium 100 milliGRAM(s) Oral every 8 hours  enoxaparin Injectable 40 milliGRAM(s) SubCutaneous daily  lactated ringers. 1000 milliLiter(s) (150 mL/Hr) IV Continuous <Continuous>  levothyroxine 150 MICROGram(s) Oral every 24 hours  polyethylene glycol 3350 17 Gram(s) Oral daily  simethicone 80 milliGRAM(s) Chew four times a day  sodium biphosphate Rectal Enema 1 Enema Rectal once    MEDICATIONS  (PRN):  acetaminophen   Tablet. 650 milliGRAM(s) Oral every 6 hours PRN Moderate Pain (4 - 6)  morphine  - Injectable 2 milliGRAM(s) IV Push every 4 hours PRN Severe Pain (7 - 10)  ondansetron Injectable 4 milliGRAM(s) IV Push every 6 hours PRN Nausea and/or Vomiting  traMADol 50 milliGRAM(s) Oral four times a day PRN Moderate Pain (4 - 6)                            13.7   5.80  )-----------( 207      ( 06 Feb 2018 07:36 )             40.4     02-06    140  |  107  |  6<L>  ----------------------------<  91  3.8   |  29  |  0.6<L>    Ca    9.1      06 Feb 2018 07:36    TPro  5.6<L>  /  Alb  3.8  /  TBili  1.0  /  DBili  0.2  /  AST  16  /  ALT  18  /  AlkPhos  32  02-06      Case discussed with housestaff & family  SARAH Corey [id number]

## 2018-02-06 NOTE — DIETITIAN INITIAL EVALUATION ADULT. - SOURCE
Spoke to pt and family member at bedside. Pt reports drinking Boost BID. NKFA./patient/family/significant other

## 2018-02-06 NOTE — PRE-ANESTHESIA EVALUATION ADULT - HEIGHT IN INCHES
----- Message from Alexa Rmaos sent at 3/13/2017  9:56 AM CDT -----  Contact: PATIENT  Calling concerning changing her birth control method. Please call patient @ [phone]. Thanks, agapito      [id number] [place] - [address]  [address] [address]  Phone: [phone] Fax: [phone]        
----- Message from Pamela Montes sent at 3/13/2017 11:55 AM CDT -----  Contact: Pt   Pt is returning a missed call.  Please advise [phone] (home)   
Does not have a OB/GYN. Because of how her Rx was written she's out of BC and is in need of more otherwise she'll miss her cycle. She's willing to see an GYN just wondered if there was anything we could do until she get there.   
LVM for pt to return a call to the clinic.  
LVM to return a call    
Will order med--------  
2

## 2018-02-06 NOTE — PROGRESS NOTE ADULT - ASSESSMENT
37y Female with Past Medical History Hypothyroidism admitted for abdominal pain r/o pancreatic CA.    Abdominal Pain r/o pancreatic CA: see above for GI recommendations.  Keep NPO prior to EUS scheduled for later this afternoon.  Continue aggressive IV hydration with Oxycodone PRN for moderate pain, Tylenol PRN for mild pain, and IV Morphine for severe pain.  CA 19-9 was slightly elevated from baseline.  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  Hypothyroidism: Continue Synthroid as directed.   GI/DVT prophylaxis

## 2018-02-06 NOTE — CONSULT NOTE ADULT - SUBJECTIVE AND OBJECTIVE BOX
VICENTE [id number]  37yFemale    HPI:      PAST MEDICAL & SURGICAL HISTORY:        MEDICATIONS  (STANDING):  dicyclomine 10 milliGRAM(s) Oral three times a day before meals  docusate sodium 100 milliGRAM(s) Oral every 8 hours  enoxaparin Injectable 40 milliGRAM(s) SubCutaneous daily  lactated ringers. 1000 milliLiter(s) (150 mL/Hr) IV Continuous <Continuous>  levothyroxine 150 MICROGram(s) Oral every 24 hours  polyethylene glycol 3350 17 Gram(s) Oral daily  simethicone 80 milliGRAM(s) Chew four times a day  sodium biphosphate Rectal Enema 1 Enema Rectal once    MEDICATIONS  (PRN):  acetaminophen   Tablet. 650 milliGRAM(s) Oral every 6 hours PRN Moderate Pain (4 - 6)  morphine  - Injectable 2 milliGRAM(s) IV Push every 4 hours PRN Severe Pain (7 - 10)  ondansetron Injectable 4 milliGRAM(s) IV Push every 6 hours PRN Nausea and/or Vomiting  traMADol 50 milliGRAM(s) Oral four times a day PRN Moderate Pain (4 - 6)      Allergies    No Known Allergies    Intolerances        REVIEW OF SYSTEMS    [ ] A ten-point review of systems was otherwise negative except as noted.    Vital Signs Last 24 Hrs  T(C): 35.7 (06 Feb 2018 16:19), Max: 36.7 (06 Feb 2018 14:00)  T(F): 96.3 (06 Feb 2018 16:19), Max: 98.1 (06 Feb 2018 14:00)  HR: 67 (06 Feb 2018 16:19) (61 - 86)  BP: 109/65 (06 Feb 2018 16:19) (109/65 - 128/84)  BP(mean): --  RR: 20 (06 Feb 2018 16:19) (18 - 20)  SpO2: 100% (06 Feb 2018 14:53) (100% - 100%)    PHYSICAL EXAM:  GENERAL: NAD, well-appearing  CHEST/LUNG: Clear to auscultation bilaterally  HEART: Regular rate and rhythm  ABDOMEN: Soft, Nontender, Nondistended; Bowel sounds present  EXTREMITIES:  No clubbing, cyanosis, or edema      LABS:                        13.7   5.80  )-----------( 207      ( 06 Feb 2018 07:36 )             40.4       02-06    140  |  107  |  6<L>  ----------------------------<  91  3.8   |  29  |  0.6<L>    Ca    9.1      06 Feb 2018 07:36    TPro  5.6<L>  /  Alb  3.8  /  TBili  1.0  /  DBili  0.2  /  AST  16  /  ALT  18  /  AlkPhos  32  02-06      PT/INR - ( 06 Feb 2018 07:36 )   PT: 14.10 sec;   INR: 1.30 ratio         PTT - ( 06 Feb 2018 07:36 )  PTT:29.2 sec    RADIOLOGY & ADDITIONAL STUDIES:    CTAP:  PANCREAS: There is enlargement and hypoattenuation of the pancreatic body measuring 5.3 x 3.4 cm with upstream atrophy of the pancreatic tail. Minimal if any peripancreatic fat stranding and no ductal dilatation. Normal enhancement of the pancreatic head. In the region of pancreatic body enlargement, there is marked attenuation of the splenic vein and portal confluence with associated collateral vasculature. Mixed attenuation within the SMV may reflect mixing artifact. Limited evaluation for thrombus given the phase of contrast.     < from: MR Abdomen w/wo IV Cont (01.01.06 @ 00:00) >  1. Pancreatic body 5 cm hypovascular lesion with upstream atrophy of the   pancreatic tail, correlating with CT. Neoplasm cannot be excluded in this   37-year-old patient, however pancreatic necrosis is still a consideration.    2. Pancreatic lesion encases portal vein at portosplenic confluence, with   attenuation/contusion of splenic vein.     3. Liver segment 7 hypervascular focus measuring 1.3 cm; possibly FNH.      < end of copied text > VICENTE [id number]  37yFemale    HPI:  38 y/o female p/w diffuse abdominal pain . Pain was sharp at times and constant to the point that she became concerned. She notes pain much better since admission. Patient along with abdominal pain notes constipation and when she passes passes stool has noted occasional blood    PAST MEDICAL & SURGICAL HISTORY:  hypothyroid      MEDICATIONS  (STANDING):  dicyclomine 10 milliGRAM(s) Oral three times a day before meals  docusate sodium 100 milliGRAM(s) Oral every 8 hours  enoxaparin Injectable 40 milliGRAM(s) SubCutaneous daily  lactated ringers. 1000 milliLiter(s) (150 mL/Hr) IV Continuous <Continuous>  levothyroxine 150 MICROGram(s) Oral every 24 hours  polyethylene glycol 3350 17 Gram(s) Oral daily  simethicone 80 milliGRAM(s) Chew four times a day  sodium biphosphate Rectal Enema 1 Enema Rectal once    MEDICATIONS  (PRN):  acetaminophen   Tablet. 650 milliGRAM(s) Oral every 6 hours PRN Moderate Pain (4 - 6)  morphine  - Injectable 2 milliGRAM(s) IV Push every 4 hours PRN Severe Pain (7 - 10)  ondansetron Injectable 4 milliGRAM(s) IV Push every 6 hours PRN Nausea and/or Vomiting  traMADol 50 milliGRAM(s) Oral four times a day PRN Moderate Pain (4 - 6)      Allergies    No Known Allergies    Intolerances        REVIEW OF SYSTEMS    [ ] A ten-point review of systems was otherwise negative except as noted.    Vital Signs Last 24 Hrs  T(C): 35.7 (06 Feb 2018 16:19), Max: 36.7 (06 Feb 2018 14:00)  T(F): 96.3 (06 Feb 2018 16:19), Max: 98.1 (06 Feb 2018 14:00)  HR: 67 (06 Feb 2018 16:19) (61 - 86)  BP: 109/65 (06 Feb 2018 16:19) (109/65 - 128/84)  BP(mean): --  RR: 20 (06 Feb 2018 16:19) (18 - 20)  SpO2: 100% (06 Feb 2018 14:53) (100% - 100%)    PHYSICAL EXAM:  GENERAL: NAD, well-appearing  CHEST/LUNG: Clear to auscultation bilaterally  HEART: Regular rate and rhythm  ABDOMEN: Soft, Nontender, Nondistended; Bowel sounds present  EXTREMITIES:  No clubbing, cyanosis, or edema      LABS:                        13.7   5.80  )-----------( 207      ( 06 Feb 2018 07:36 )             40.4       02-06    140  |  107  |  6<L>  ----------------------------<  91  3.8   |  29  |  0.6<L>    Ca    9.1      06 Feb 2018 07:36    TPro  5.6<L>  /  Alb  3.8  /  TBili  1.0  /  DBili  0.2  /  AST  16  /  ALT  18  /  AlkPhos  32  02-06      PT/INR - ( 06 Feb 2018 07:36 )   PT: 14.10 sec;   INR: 1.30 ratio         PTT - ( 06 Feb 2018 07:36 )  PTT:29.2 sec    RADIOLOGY & ADDITIONAL STUDIES:    CTAP:  PANCREAS: There is enlargement and hypoattenuation of the pancreatic body measuring 5.3 x 3.4 cm with upstream atrophy of the pancreatic tail. Minimal if any peripancreatic fat stranding and no ductal dilatation. Normal enhancement of the pancreatic head. In the region of pancreatic body enlargement, there is marked attenuation of the splenic vein and portal confluence with associated collateral vasculature. Mixed attenuation within the SMV may reflect mixing artifact. Limited evaluation for thrombus given the phase of contrast.     < from: MR Abdomen w/wo IV Cont (01.01.06 @ 00:00) >  1. Pancreatic body 5 cm hypovascular lesion with upstream atrophy of the   pancreatic tail, correlating with CT. Neoplasm cannot be excluded in this   37-year-old patient, however pancreatic necrosis is still a consideration.    2. Pancreatic lesion encases portal vein at portosplenic confluence, with   attenuation/contusion of splenic vein.     3. Liver segment 7 hypervascular focus measuring 1.3 cm; possibly FNH.      < end of copied text > VICENTE [id number]  37yFemale    HPI:  36 y/o female p/w bilateral flank/abdominal pain . sharp, constant  non radiating , a/w adominal distention for 1 week duration. mother chronic pancreatitis ct showed 5cm mass in body of pancreas with tail atrophy     PAST MEDICAL & SURGICAL HISTORY:  hypothyroidism s/p thyroidectomy hasimoto thyroiditis  abdominoplasty     MEDICATIONS  (STANDING):  dicyclomine 10 milliGRAM(s) Oral three times a day before meals  docusate sodium 100 milliGRAM(s) Oral every 8 hours  enoxaparin Injectable 40 milliGRAM(s) SubCutaneous daily  lactated ringers. 1000 milliLiter(s) (150 mL/Hr) IV Continuous <Continuous>  levothyroxine 150 MICROGram(s) Oral every 24 hours  polyethylene glycol 3350 17 Gram(s) Oral daily  simethicone 80 milliGRAM(s) Chew four times a day  sodium biphosphate Rectal Enema 1 Enema Rectal once    MEDICATIONS  (PRN):  acetaminophen   Tablet. 650 milliGRAM(s) Oral every 6 hours PRN Moderate Pain (4 - 6)  morphine  - Injectable 2 milliGRAM(s) IV Push every 4 hours PRN Severe Pain (7 - 10)  ondansetron Injectable 4 milliGRAM(s) IV Push every 6 hours PRN Nausea and/or Vomiting  traMADol 50 milliGRAM(s) Oral four times a day PRN Moderate Pain (4 - 6)      Allergies    No Known Allergies    Intolerances        REVIEW OF SYSTEMS    [ ] A ten-point review of systems was otherwise negative except as noted.    Vital Signs Last 24 Hrs  T(C): 35.7 (06 Feb 2018 16:19), Max: 36.7 (06 Feb 2018 14:00)  T(F): 96.3 (06 Feb 2018 16:19), Max: 98.1 (06 Feb 2018 14:00)  HR: 67 (06 Feb 2018 16:19) (61 - 86)  BP: 109/65 (06 Feb 2018 16:19) (109/65 - 128/84)  BP(mean): --  RR: 20 (06 Feb 2018 16:19) (18 - 20)  SpO2: 100% (06 Feb 2018 14:53) (100% - 100%)    PHYSICAL EXAM:  GENERAL: NAD, well-appearing  CHEST/LUNG: Clear to auscultation bilaterally  HEART: Regular rate and rhythm  ABDOMEN: Soft, Nontender, distended; Bowel sounds present  EXTREMITIES:  No clubbing, cyanosis, or edema      LABS:                        13.7   5.80  )-----------( 207      ( 06 Feb 2018 07:36 )             40.4       02-06    140  |  107  |  6<L>  ----------------------------<  91  3.8   |  29  |  0.6<L>    Ca    9.1      06 Feb 2018 07:36    TPro  5.6<L>  /  Alb  3.8  /  TBili  1.0  /  DBili  0.2  /  AST  16  /  ALT  18  /  AlkPhos  32  02-06      PT/INR - ( 06 Feb 2018 07:36 )   PT: 14.10 sec;   INR: 1.30 ratio         PTT - ( 06 Feb 2018 07:36 )  PTT:29.2 sec    CA 19-9 109.9  LIPASE 19  RADIOLOGY & ADDITIONAL STUDIES:    CTAP:  PANCREAS: There is enlargement and hypoattenuation of the pancreatic body measuring 5.3 x 3.4 cm with upstream atrophy of the pancreatic tail. Minimal if any peripancreatic fat stranding and no ductal dilatation. Normal enhancement of the pancreatic head. In the region of pancreatic body enlargement, there is marked attenuation of the splenic vein and portal confluence with associated collateral vasculature. Mixed attenuation within the SMV may reflect mixing artifact. Limited evaluation for thrombus given the phase of contrast.     < from: MR Abdomen w/wo IV Cont (01.01.06 @ 00:00) >  1. Pancreatic body 5 cm hypovascular lesion with upstream atrophy of the   pancreatic tail, correlating with CT. Neoplasm cannot be excluded in this   37-year-old patient, however pancreatic necrosis is still a consideration.    2. Pancreatic lesion encases portal vein at portosplenic confluence, with   attenuation/contusion of splenic vein.     3. Liver segment 7 hypervascular focus measuring 1.3 cm; possibly FNH.      < end of copied text >

## 2018-02-07 LAB
ALBUMIN SERPL ELPH-MCNC: 4.3 G/DL — SIGNIFICANT CHANGE UP (ref 3–5.5)
ALP SERPL-CCNC: 36 U/L — SIGNIFICANT CHANGE UP (ref 30–115)
ALT FLD-CCNC: 20 U/L — SIGNIFICANT CHANGE UP (ref 0–41)
APPEARANCE UR: (no result)
AST SERPL-CCNC: 19 U/L — SIGNIFICANT CHANGE UP (ref 0–41)
BASOPHILS # BLD AUTO: 0.05 K/UL — SIGNIFICANT CHANGE UP (ref 0–0.2)
BASOPHILS NFR BLD AUTO: 0.6 % — SIGNIFICANT CHANGE UP (ref 0–1)
BILIRUB DIRECT SERPL-MCNC: 0.2 MG/DL — SIGNIFICANT CHANGE UP (ref 0–0.2)
BILIRUB INDIRECT FLD-MCNC: 1 MG/DL — SIGNIFICANT CHANGE UP
BILIRUB SERPL-MCNC: 1.2 MG/DL — SIGNIFICANT CHANGE UP (ref 0.2–1.2)
BILIRUB UR-MCNC: NEGATIVE — SIGNIFICANT CHANGE UP
COLOR SPEC: YELLOW — SIGNIFICANT CHANGE UP
DIFF PNL FLD: NEGATIVE — SIGNIFICANT CHANGE UP
EOSINOPHIL # BLD AUTO: 0.12 K/UL — SIGNIFICANT CHANGE UP (ref 0–0.7)
EOSINOPHIL NFR BLD AUTO: 1.5 % — SIGNIFICANT CHANGE UP (ref 0–8)
GLUCOSE UR QL: NEGATIVE MG/DL — SIGNIFICANT CHANGE UP
HCT VFR BLD CALC: 41.1 % — SIGNIFICANT CHANGE UP (ref 37–47)
HGB BLD-MCNC: 14.1 G/DL — SIGNIFICANT CHANGE UP (ref 14–18)
IMM GRANULOCYTES NFR BLD AUTO: 0.4 % — HIGH (ref 0.1–0.3)
KETONES UR-MCNC: NEGATIVE — SIGNIFICANT CHANGE UP
LEUKOCYTE ESTERASE UR-ACNC: NEGATIVE — SIGNIFICANT CHANGE UP
LYMPHOCYTES # BLD AUTO: 2.85 K/UL — SIGNIFICANT CHANGE UP (ref 1.2–3.4)
LYMPHOCYTES # BLD AUTO: 34.9 % — SIGNIFICANT CHANGE UP (ref 20.5–51.1)
MCHC RBC-ENTMCNC: 29.3 PG — SIGNIFICANT CHANGE UP (ref 27–31)
MCHC RBC-ENTMCNC: 34.3 G/DL — SIGNIFICANT CHANGE UP (ref 32–37)
MCV RBC AUTO: 85.3 FL — SIGNIFICANT CHANGE UP (ref 81–91)
MONOCYTES # BLD AUTO: 0.51 K/UL — SIGNIFICANT CHANGE UP (ref 0.1–0.6)
MONOCYTES NFR BLD AUTO: 6.3 % — SIGNIFICANT CHANGE UP (ref 1.7–9.3)
NEUTROPHILS # BLD AUTO: 4.6 K/UL — SIGNIFICANT CHANGE UP (ref 1.4–6.5)
NEUTROPHILS NFR BLD AUTO: 56.3 % — SIGNIFICANT CHANGE UP (ref 42.2–75.2)
NITRITE UR-MCNC: NEGATIVE — SIGNIFICANT CHANGE UP
NRBC # BLD: 0 /100 WBCS — SIGNIFICANT CHANGE UP (ref 0–0)
PH UR: 6 — SIGNIFICANT CHANGE UP (ref 5–8)
PLATELET # BLD AUTO: 224 K/UL — SIGNIFICANT CHANGE UP (ref 130–400)
PROT SERPL-MCNC: 6.3 G/DL — SIGNIFICANT CHANGE UP (ref 6–8)
PROT UR-MCNC: NEGATIVE MG/DL — SIGNIFICANT CHANGE UP
RBC # BLD: 4.82 M/UL — SIGNIFICANT CHANGE UP (ref 4.2–5.4)
RBC # FLD: 12.5 % — SIGNIFICANT CHANGE UP (ref 11.5–14.5)
SP GR SPEC: 1.01 — SIGNIFICANT CHANGE UP (ref 1.01–1.03)
UROBILINOGEN FLD QL: 1 MG/DL (ref 0.2–0.2)
WBC # BLD: 8.16 K/UL — SIGNIFICANT CHANGE UP (ref 4.8–10.8)
WBC # FLD AUTO: 8.16 K/UL — SIGNIFICANT CHANGE UP (ref 4.8–10.8)

## 2018-02-07 RX ORDER — HYDROMORPHONE HYDROCHLORIDE 2 MG/ML
0.5 INJECTION INTRAMUSCULAR; INTRAVENOUS; SUBCUTANEOUS ONCE
Qty: 0 | Refills: 0 | Status: DISCONTINUED | OUTPATIENT
Start: 2018-02-07 | End: 2018-02-07

## 2018-02-07 RX ADMIN — Medication 1 ENEMA: at 13:21

## 2018-02-07 RX ADMIN — TRAMADOL HYDROCHLORIDE 50 MILLIGRAM(S): 50 TABLET ORAL at 15:27

## 2018-02-07 RX ADMIN — Medication 10 MILLIGRAM(S): at 15:31

## 2018-02-07 RX ADMIN — TRAMADOL HYDROCHLORIDE 50 MILLIGRAM(S): 50 TABLET ORAL at 01:26

## 2018-02-07 RX ADMIN — Medication 100 MILLIGRAM(S): at 13:22

## 2018-02-07 RX ADMIN — Medication 150 MICROGRAM(S): at 05:26

## 2018-02-07 RX ADMIN — SIMETHICONE 80 MILLIGRAM(S): 80 TABLET, CHEWABLE ORAL at 17:21

## 2018-02-07 RX ADMIN — Medication 10 MILLIGRAM(S): at 11:17

## 2018-02-07 RX ADMIN — HYDROMORPHONE HYDROCHLORIDE 0.5 MILLIGRAM(S): 2 INJECTION INTRAMUSCULAR; INTRAVENOUS; SUBCUTANEOUS at 23:58

## 2018-02-07 RX ADMIN — MORPHINE SULFATE 2 MILLIGRAM(S): 50 CAPSULE, EXTENDED RELEASE ORAL at 19:20

## 2018-02-07 RX ADMIN — HYDROMORPHONE HYDROCHLORIDE 0.5 MILLIGRAM(S): 2 INJECTION INTRAMUSCULAR; INTRAVENOUS; SUBCUTANEOUS at 23:28

## 2018-02-07 RX ADMIN — POLYETHYLENE GLYCOL 3350 17 GRAM(S): 17 POWDER, FOR SOLUTION ORAL at 12:19

## 2018-02-07 RX ADMIN — Medication 100 MILLIGRAM(S): at 05:26

## 2018-02-07 RX ADMIN — SIMETHICONE 80 MILLIGRAM(S): 80 TABLET, CHEWABLE ORAL at 11:16

## 2018-02-07 RX ADMIN — Medication 100 MILLIGRAM(S): at 21:26

## 2018-02-07 RX ADMIN — Medication 10 MILLIGRAM(S): at 08:00

## 2018-02-07 RX ADMIN — TRAMADOL HYDROCHLORIDE 50 MILLIGRAM(S): 50 TABLET ORAL at 16:31

## 2018-02-07 NOTE — PROGRESS NOTE ADULT - SUBJECTIVE AND OBJECTIVE BOX
Patient is a 37y old  Female who presents with a chief complaint of     PAST MEDICAL & SURGICAL HISTORY:    FAMILY HISTORY:      Social: (-) tobacco, alcohol, drug use    MEDICATIONS  (STANDING):  dicyclomine 10 milliGRAM(s) Oral three times a day before meals  docusate sodium 100 milliGRAM(s) Oral every 8 hours  enoxaparin Injectable 40 milliGRAM(s) SubCutaneous daily  lactated ringers. 1000 milliLiter(s) (150 mL/Hr) IV Continuous <Continuous>  levothyroxine 150 MICROGram(s) Oral every 24 hours  polyethylene glycol 3350 17 Gram(s) Oral daily  simethicone 80 milliGRAM(s) Chew four times a day  sodium biphosphate Rectal Enema 1 Enema Rectal once    MEDICATIONS  (PRN):  acetaminophen   Tablet. 650 milliGRAM(s) Oral every 6 hours PRN Moderate Pain (4 - 6)  morphine  - Injectable 2 milliGRAM(s) IV Push every 4 hours PRN Severe Pain (7 - 10)  ondansetron Injectable 4 milliGRAM(s) IV Push every 6 hours PRN Nausea and/or Vomiting  traMADol 50 milliGRAM(s) Oral four times a day PRN Moderate Pain (4 - 6)      Overnight events:    Vital Signs Last 24 Hrs  T(C): 35.2 (07 Feb 2018 05:48), Max: 36.7 (06 Feb 2018 14:00)  T(F): 95.4 (07 Feb 2018 05:48), Max: 98.1 (06 Feb 2018 14:00)  HR: 73 (07 Feb 2018 05:48) (61 - 85)  BP: 105/74 (07 Feb 2018 05:48) (105/74 - 128/84)  BP(mean): --  RR: 20 (07 Feb 2018 05:48) (18 - 20)  SpO2: 100% (06 Feb 2018 14:53) (100% - 100%)  CAPILLARY BLOOD GLUCOSE        I&O's Summary    06 Feb 2018 07:01  -  07 Feb 2018 07:00  --------------------------------------------------------  IN: 2000 mL / OUT: 0 mL / NET: 2000 mL        Physical Exam:    -     General : In no acute distress    -      Cardiac: RRR , no added sounds    -      Pulm: GBAE, clear lungs    -      GI: soft abdomen, positive BS< no tenderness     -      Musculoskeletal: normal ROM , no joint tenderness    -      Neuro: AAOx3, no focal neurologic deficit        Labs:                        13.7   5.80  )-----------( 207      ( 06 Feb 2018 07:36 )             40.4             02-06    140  |  107  |  6<L>  ----------------------------<  91  3.8   |  29  |  0.6<L>    Ca    9.1      06 Feb 2018 07:36    TPro  5.6<L>  /  Alb  3.8  /  TBili  1.0  /  DBili  0.2  /  AST  16  /  ALT  18  /  AlkPhos  32  02-06    LIVER FUNCTIONS - ( 06 Feb 2018 07:36 )  Alb: 3.8 g/dL / Pro: 5.6 g/dL / ALK PHOS: 32 U/L / ALT: 18 U/L / AST: 16 U/L / GGT: x                 PT/INR - ( 06 Feb 2018 07:36 )   PT: 14.10 sec;   INR: 1.30 ratio         PTT - ( 06 Feb 2018 07:36 )  PTT:29.2 sec              Imaging:    ECG: Patient is a 37y old  Female who presents with a chief complaint of abdominal pain and constipation    Social: (-) tobacco, alcohol, drug use    MEDICATIONS  (STANDING):  dicyclomine 10 milliGRAM(s) Oral three times a day before meals  docusate sodium 100 milliGRAM(s) Oral every 8 hours  enoxaparin Injectable 40 milliGRAM(s) SubCutaneous daily  lactated ringers. 1000 milliLiter(s) (150 mL/Hr) IV Continuous <Continuous>  levothyroxine 150 MICROGram(s) Oral every 24 hours  polyethylene glycol 3350 17 Gram(s) Oral daily  simethicone 80 milliGRAM(s) Chew four times a day  sodium biphosphate Rectal Enema 1 Enema Rectal once    MEDICATIONS  (PRN):  acetaminophen   Tablet. 650 milliGRAM(s) Oral every 6 hours PRN Moderate Pain (4 - 6)  morphine  - Injectable 2 milliGRAM(s) IV Push every 4 hours PRN Severe Pain (7 - 10)  ondansetron Injectable 4 milliGRAM(s) IV Push every 6 hours PRN Nausea and/or Vomiting  traMADol 50 milliGRAM(s) Oral four times a day PRN Moderate Pain (4 - 6)      Overnight events:    Vital Signs Last 24 Hrs  T(C): 35.2 (07 Feb 2018 05:48), Max: 36.7 (06 Feb 2018 14:00)  T(F): 95.4 (07 Feb 2018 05:48), Max: 98.1 (06 Feb 2018 14:00)  HR: 73 (07 Feb 2018 05:48) (61 - 85)  BP: 105/74 (07 Feb 2018 05:48) (105/74 - 128/84)  BP(mean): --  RR: 20 (07 Feb 2018 05:48) (18 - 20)  SpO2: 100% (06 Feb 2018 14:53) (100% - 100%)  CAPILLARY BLOOD GLUCOSE        I&O's Summary    06 Feb 2018 07:01  -  07 Feb 2018 07:00  --------------------------------------------------------  IN: 2000 mL / OUT: 0 mL / NET: 2000 mL        Physical Exam:    -     General : In no acute distress    -      Cardiac: RRR , no added sounds    -      Pulm: GBAE, clear lungs    -      GI: soft abdomen, positive BS< no tenderness     -      Musculoskeletal: normal ROM , no joint tenderness    -      Neuro: AAOx3, no focal neurologic deficit        Labs:                        13.7   5.80  )-----------( 207      ( 06 Feb 2018 07:36 )             40.4             02-06    140  |  107  |  6<L>  ----------------------------<  91  3.8   |  29  |  0.6<L>    Ca    9.1      06 Feb 2018 07:36    TPro  5.6<L>  /  Alb  3.8  /  TBili  1.0  /  DBili  0.2  /  AST  16  /  ALT  18  /  AlkPhos  32  02-06    LIVER FUNCTIONS - ( 06 Feb 2018 07:36 )  Alb: 3.8 g/dL / Pro: 5.6 g/dL / ALK PHOS: 32 U/L / ALT: 18 U/L / AST: 16 U/L / GGT: x                 PT/INR - ( 06 Feb 2018 07:36 )   PT: 14.10 sec;   INR: 1.30 ratio         PTT - ( 06 Feb 2018 07:36 )  PTT:29.2 sec              Imaging:    ECG:

## 2018-02-07 NOTE — PROGRESS NOTE ADULT - ASSESSMENT
37y Female with Past Medical History Hypothyroidism admitted for abdominal pain r/o pancreatic CA.    Abdominal Pain r/o pancreatic CA: see above for findings from EUS (awaiting official report).  Gastroenterology recommended treatment for pancreatitis.  Continue IVF (LR @ >125cc/hr) with Oxycodone PRN for moderate pain, Tylenol PRN for mild pain, and IV Morphine for severe pain.  Follow-up results from IgG4 and pathology.  Follow-up with surgical oncology attending.  Hypothyroidism: Continue Synthroid as directed.   GI/DVT prophylaxis

## 2018-02-07 NOTE — PROGRESS NOTE ADULT - SUBJECTIVE AND OBJECTIVE BOX
[facility] [place] [id number] [address] BUSHKA [id number]  [id number]    SURGICAL INTERN PROGRESS NOTE  HPI: 37F with 5cm hypovascular mass in pancreatic body with upstream atrophy of the pancreatic tail.     ADMISSION DIAGNOSIS:HEALTH ISSUES - PROBLEM Dx:  Hypothyroidism, unspecified type: Hypothyroidism, unspecified type  Pancreatic lesion: Pancreatic lesion    OVERNIGHT EVENTS: No acute overnight events  VITALS: Vital Signs Last 24 Hrs  T(C): 36.7 (06 Feb 2018 21:00), Max: 36.7 (06 Feb 2018 14:00)  T(F): 98.1 (06 Feb 2018 21:00), Max: 98.1 (06 Feb 2018 14:00)  HR: 85 (06 Feb 2018 21:00) (61 - 85)  BP: 108/764 (06 Feb 2018 21:00) (108/764 - 128/84)  BP(mean): --  RR: 20 (06 Feb 2018 21:00) (18 - 20)  SpO2: 100% (06 Feb 2018 14:53) (100% - 100%)  I/O:I&O's Summary    05 Feb 2018 07:01  -  06 Feb 2018 07:00  --------------------------------------------------------  IN: 300 mL / OUT: 0 mL / NET: 300 mL    06 Feb 2018 07:01  -  07 Feb 2018 04:51  --------------------------------------------------------  IN: 1000 mL / OUT: 0 mL / NET: 1000 mL       02-05-18 @ 07:01  -  02-06-18 @ 07:00  --------------------------------------------------------  IN: 300 mL / OUT: 0 mL / NET: 300 mL    02-06-18 @ 07:01  -  02-07-18 @ 04:51  --------------------------------------------------------  IN: 1000 mL / OUT: 0 mL / NET: 1000 mL      BOWEL MOVEMENT: Y/N Yes  FLATUS: Y/N Yes    PHYSICAL EXAM: Mild epigastric tenderness    MEDICATIONS: MEDICATIONS  (STANDING):  dicyclomine 10 milliGRAM(s) Oral three times a day before meals  docusate sodium 100 milliGRAM(s) Oral every 8 hours  enoxaparin Injectable 40 milliGRAM(s) SubCutaneous daily  lactated ringers. 1000 milliLiter(s) (150 mL/Hr) IV Continuous <Continuous>  levothyroxine 150 MICROGram(s) Oral every 24 hours  polyethylene glycol 3350 17 Gram(s) Oral daily  simethicone 80 milliGRAM(s) Chew four times a day  sodium biphosphate Rectal Enema 1 Enema Rectal once    MEDICATIONS  (PRN):  acetaminophen   Tablet. 650 milliGRAM(s) Oral every 6 hours PRN Moderate Pain (4 - 6)  morphine  - Injectable 2 milliGRAM(s) IV Push every 4 hours PRN Severe Pain (7 - 10)  ondansetron Injectable 4 milliGRAM(s) IV Push every 6 hours PRN Nausea and/or Vomiting  traMADol 50 milliGRAM(s) Oral four times a day PRN Moderate Pain (4 - 6)      LABS:                        13.7   5.80  )-----------( 207      ( 06 Feb 2018 07:36 )             40.4     02-06    140  |  107  |  6<L>  ----------------------------<  91  3.8   |  29  |  0.6<L>    Ca    9.1      06 Feb 2018 07:36    TPro  5.6<L>  /  Alb  3.8  /  TBili  1.0  /  DBili  0.2  /  AST  16  /  ALT  18  /  AlkPhos  32  02-06    PT/INR - ( 06 Feb 2018 07:36 )   PT: 14.10 sec;   INR: 1.30 ratio         PTT - ( 06 Feb 2018 07:36 )  PTT:29.2 sec  LIVER FUNCTIONS - ( 06 Feb 2018 07:36 )  Alb: 3.8 g/dL / Pro: 5.6 g/dL / ALK PHOS: 32 U/L / ALT: 18 U/L / AST: 16 U/L / GGT: x           RADIOLOGY & ADDITIONAL STUDIES:   Pancreatic body 5 cm hypovascular lesion with upstream atrophy of the pancreatic tail,  Pancreatic lesion encases portal vein at portosplenic confluence, with   attenuation/contusion of splenic vein.

## 2018-02-07 NOTE — PROGRESS NOTE ADULT - SUBJECTIVE AND OBJECTIVE BOX
VICENTE TAVERAS MRN-[id number]    Hospitalist Note  37y Female with Past Medical History Hypothyroidism admitted for abdominal pain r/o pancreatic CA vs. pancreatitis    Overnight events/Updates: The pt underwent EUS yesterday afternoon.  Findings were suspicious for an underlying mass.  Surgical Oncology was consulted for further evaluation    Vital Signs Last 24 Hrs  T(C): 36.1 (07 Feb 2018 12:43), Max: 36.7 (06 Feb 2018 21:00)  T(F): 97 (07 Feb 2018 12:43), Max: 98.1 (06 Feb 2018 21:00)  HR: 75 (07 Feb 2018 12:43) (73 - 85)  BP: 106/61 (07 Feb 2018 12:43) (105/74 - 108/764)  BP(mean): --  RR: 20 (07 Feb 2018 12:43) (20 - 20)  SpO2: --    Physical Examination:  General: AAO x 3  HEENT: PERRLA, EOMI  CV= S1 & S2 appreciated  Lungs= CTA BL  Abdominal Examination= + BS, mild to moderate distension  Extremity Examination= No C/C/E    ROS: No chest pain, no shortness of breath.  All other systems reviewed and are within normal limits except for the complaints in the HPI.    MEDICATIONS  (STANDING):  dicyclomine 10 milliGRAM(s) Oral three times a day before meals  docusate sodium 100 milliGRAM(s) Oral every 8 hours  enoxaparin Injectable 40 milliGRAM(s) SubCutaneous daily  lactated ringers. 1000 milliLiter(s) (150 mL/Hr) IV Continuous <Continuous>  levothyroxine 150 MICROGram(s) Oral every 24 hours  polyethylene glycol 3350 17 Gram(s) Oral daily  simethicone 80 milliGRAM(s) Chew four times a day    MEDICATIONS  (PRN):  acetaminophen   Tablet. 650 milliGRAM(s) Oral every 6 hours PRN Moderate Pain (4 - 6)  morphine  - Injectable 2 milliGRAM(s) IV Push every 4 hours PRN Severe Pain (7 - 10)  ondansetron Injectable 4 milliGRAM(s) IV Push every 6 hours PRN Nausea and/or Vomiting  traMADol 50 milliGRAM(s) Oral four times a day PRN Moderate Pain (4 - 6)                            14.1   8.16  )-----------( 224      ( 07 Feb 2018 07:18 )             41.1     02-06    140  |  107  |  6<L>  ----------------------------<  91  3.8   |  29  |  0.6<L>    Ca    9.1      06 Feb 2018 07:36    TPro  6.3  /  Alb  4.3  /  TBili  1.2  /  DBili  0.2  /  AST  19  /  ALT  20  /  AlkPhos  36  02-07      Case discussed with housestaff & family  SARAH Corey [id number]

## 2018-02-07 NOTE — PROGRESS NOTE ADULT - ASSESSMENT
38 y/o female presenting with abdominal Pain and chronic constipation found to have a 5cm area on the head of her Pancrease with downstream Atrophy on MRCP.    # Abdominal Pain/ 5cm pancreatic mass vs necrosis  R/o pancreatic tumor vs autoimmune pancreatitis   CA 19-9 was slightly elevated from baseline  -  Igg4 levels pending  -  EUS +/- FNA  - for any fever, WBC elevation, or worsening of abdominal pain, please notify advanced GI immediately- [phone]  -Pain control with Tramadol, continue IV hydration    # Chronic constipation   -continue with miralax, colace  -outpatient colonoscopy    #Hypothyroidism on Levothyroxine    #DVT prophylaxis 36 y/o female presenting with abdominal Pain and chronic constipation found to have a 5cm area on the head of her Pancrease with downstream Atrophy on MRCP.    # Abdominal Pain/ 5cm pancreatic mass vs necrosis  R/o pancreatic tumor vs autoimmune pancreatitis   CA 19-9 was slightly elevated from baseline  -  Igg4 levels pending  -  EUS + FNA done yesterday, biopsy results pending  - surgery Dr Haley on board  - for any fever, WBC elevation, or worsening of abdominal pain, please notify advanced GI immediately- [phone]  -Pain control with Tramadol and morphine prn, continue IV hydration    # Chronic constipation   -continue with miralax, colace  -outpatient colonoscopy    #Hypothyroidism on Levothyroxine    #DVT prophylaxis

## 2018-02-08 LAB
ALBUMIN SERPL ELPH-MCNC: 3.7 G/DL — SIGNIFICANT CHANGE UP (ref 3–5.5)
ALP SERPL-CCNC: 35 U/L — SIGNIFICANT CHANGE UP (ref 30–115)
ALT FLD-CCNC: 19 U/L — SIGNIFICANT CHANGE UP (ref 0–41)
ANION GAP SERPL CALC-SCNC: 6 MMOL/L — LOW (ref 7–14)
AST SERPL-CCNC: 16 U/L — SIGNIFICANT CHANGE UP (ref 0–41)
BASOPHILS # BLD AUTO: 0.05 K/UL — SIGNIFICANT CHANGE UP (ref 0–0.2)
BASOPHILS NFR BLD AUTO: 0.8 % — SIGNIFICANT CHANGE UP (ref 0–1)
BILIRUB SERPL-MCNC: 1.1 MG/DL — SIGNIFICANT CHANGE UP (ref 0.2–1.2)
BUN SERPL-MCNC: 9 MG/DL — LOW (ref 10–20)
CALCIUM SERPL-MCNC: 9.1 MG/DL — SIGNIFICANT CHANGE UP (ref 8.5–10.1)
CHLORIDE SERPL-SCNC: 105 MMOL/L — SIGNIFICANT CHANGE UP (ref 98–110)
CO2 SERPL-SCNC: 28 MMOL/L — SIGNIFICANT CHANGE UP (ref 17–32)
CREAT SERPL-MCNC: 0.5 MG/DL — LOW (ref 0.7–1.5)
EOSINOPHIL # BLD AUTO: 0.11 K/UL — SIGNIFICANT CHANGE UP (ref 0–0.7)
EOSINOPHIL NFR BLD AUTO: 1.8 % — SIGNIFICANT CHANGE UP (ref 0–8)
GLUCOSE SERPL-MCNC: 94 MG/DL — SIGNIFICANT CHANGE UP (ref 70–110)
HCT VFR BLD CALC: 38.1 % — SIGNIFICANT CHANGE UP (ref 37–47)
HGB BLD-MCNC: 13.1 G/DL — LOW (ref 14–18)
IMM GRANULOCYTES NFR BLD AUTO: 0.3 % — SIGNIFICANT CHANGE UP (ref 0.1–0.3)
LIDOCAIN IGE QN: 16 U/L — SIGNIFICANT CHANGE UP (ref 7–60)
LYMPHOCYTES # BLD AUTO: 2.56 K/UL — SIGNIFICANT CHANGE UP (ref 1.2–3.4)
LYMPHOCYTES # BLD AUTO: 42.9 % — SIGNIFICANT CHANGE UP (ref 20.5–51.1)
MCHC RBC-ENTMCNC: 29.2 PG — SIGNIFICANT CHANGE UP (ref 27–31)
MCHC RBC-ENTMCNC: 34.4 G/DL — SIGNIFICANT CHANGE UP (ref 32–37)
MCV RBC AUTO: 85 FL — SIGNIFICANT CHANGE UP (ref 81–91)
MONOCYTES # BLD AUTO: 0.38 K/UL — SIGNIFICANT CHANGE UP (ref 0.1–0.6)
MONOCYTES NFR BLD AUTO: 6.4 % — SIGNIFICANT CHANGE UP (ref 1.7–9.3)
NEUTROPHILS # BLD AUTO: 2.85 K/UL — SIGNIFICANT CHANGE UP (ref 1.4–6.5)
NEUTROPHILS NFR BLD AUTO: 47.8 % — SIGNIFICANT CHANGE UP (ref 42.2–75.2)
NRBC # BLD: 0 /100 WBCS — SIGNIFICANT CHANGE UP (ref 0–0)
PLATELET # BLD AUTO: 193 K/UL — SIGNIFICANT CHANGE UP (ref 130–400)
POTASSIUM SERPL-MCNC: 3.8 MMOL/L — SIGNIFICANT CHANGE UP (ref 3.5–5)
POTASSIUM SERPL-SCNC: 3.8 MMOL/L — SIGNIFICANT CHANGE UP (ref 3.5–5)
PROT SERPL-MCNC: 5.6 G/DL — LOW (ref 6–8)
RBC # BLD: 4.48 M/UL — SIGNIFICANT CHANGE UP (ref 4.2–5.4)
RBC # FLD: 12.7 % — SIGNIFICANT CHANGE UP (ref 11.5–14.5)
SODIUM SERPL-SCNC: 139 MMOL/L — SIGNIFICANT CHANGE UP (ref 135–146)
WBC # BLD: 5.97 K/UL — SIGNIFICANT CHANGE UP (ref 4.8–10.8)
WBC # FLD AUTO: 5.97 K/UL — SIGNIFICANT CHANGE UP (ref 4.8–10.8)

## 2018-02-08 RX ORDER — SOD SULF/SODIUM/NAHCO3/KCL/PEG
2000 SOLUTION, RECONSTITUTED, ORAL ORAL ONCE
Qty: 0 | Refills: 0 | Status: COMPLETED | OUTPATIENT
Start: 2018-02-08 | End: 2018-02-08

## 2018-02-08 RX ORDER — SENNA PLUS 8.6 MG/1
2 TABLET ORAL AT BEDTIME
Qty: 0 | Refills: 0 | Status: DISCONTINUED | OUTPATIENT
Start: 2018-02-08 | End: 2018-02-10

## 2018-02-08 RX ORDER — HYDROMORPHONE HYDROCHLORIDE 2 MG/ML
2 INJECTION INTRAMUSCULAR; INTRAVENOUS; SUBCUTANEOUS
Qty: 0 | Refills: 0 | Status: DISCONTINUED | OUTPATIENT
Start: 2018-02-08 | End: 2018-02-08

## 2018-02-08 RX ORDER — OXYCODONE HYDROCHLORIDE 5 MG/1
5 TABLET ORAL
Qty: 0 | Refills: 0 | Status: DISCONTINUED | OUTPATIENT
Start: 2018-02-08 | End: 2018-02-09

## 2018-02-08 RX ORDER — HYDROMORPHONE HYDROCHLORIDE 2 MG/ML
0.5 INJECTION INTRAMUSCULAR; INTRAVENOUS; SUBCUTANEOUS
Qty: 0 | Refills: 0 | Status: DISCONTINUED | OUTPATIENT
Start: 2018-02-08 | End: 2018-02-09

## 2018-02-08 RX ADMIN — SIMETHICONE 80 MILLIGRAM(S): 80 TABLET, CHEWABLE ORAL at 05:38

## 2018-02-08 RX ADMIN — HYDROMORPHONE HYDROCHLORIDE 0.5 MILLIGRAM(S): 2 INJECTION INTRAMUSCULAR; INTRAVENOUS; SUBCUTANEOUS at 16:22

## 2018-02-08 RX ADMIN — MORPHINE SULFATE 2 MILLIGRAM(S): 50 CAPSULE, EXTENDED RELEASE ORAL at 06:30

## 2018-02-08 RX ADMIN — Medication 150 MICROGRAM(S): at 05:46

## 2018-02-08 RX ADMIN — MORPHINE SULFATE 2 MILLIGRAM(S): 50 CAPSULE, EXTENDED RELEASE ORAL at 05:39

## 2018-02-08 RX ADMIN — Medication 100 MILLIGRAM(S): at 14:21

## 2018-02-08 RX ADMIN — ONDANSETRON 4 MILLIGRAM(S): 8 TABLET, FILM COATED ORAL at 05:40

## 2018-02-08 RX ADMIN — OXYCODONE HYDROCHLORIDE 5 MILLIGRAM(S): 5 TABLET ORAL at 22:14

## 2018-02-08 RX ADMIN — OXYCODONE HYDROCHLORIDE 5 MILLIGRAM(S): 5 TABLET ORAL at 20:41

## 2018-02-08 RX ADMIN — HYDROMORPHONE HYDROCHLORIDE 0.5 MILLIGRAM(S): 2 INJECTION INTRAMUSCULAR; INTRAVENOUS; SUBCUTANEOUS at 23:45

## 2018-02-08 RX ADMIN — ENOXAPARIN SODIUM 40 MILLIGRAM(S): 100 INJECTION SUBCUTANEOUS at 11:41

## 2018-02-08 RX ADMIN — Medication 2000 MILLILITER(S): at 18:27

## 2018-02-08 RX ADMIN — Medication 100 MILLIGRAM(S): at 05:39

## 2018-02-08 NOTE — PROGRESS NOTE ADULT - ASSESSMENT
38 y/o female presenting with abdominal Pain and chronic constipation found to have a 5cm area on the head of her Pancrease with downstream Atrophy on MRCP.  Her pain is still not controlled    # Abdominal Pain/ 5cm pancreatic mass vs necrosis  R/o pancreatic tumor vs autoimmune pancreatitis   CA 19-9 was slightly elevated from baseline  -  Igg4 levels pending  -  EUS + FNA done 2/6, biopsy results pending  - surgery Dr Haley on board, outpatient follow up after result of biospy and IG G4  -Pain control, continue IV hydration    # Chronic constipation   -continue with miralax, colace and senna  -outpatient colonoscopy    #Hypothyroidism on Levothyroxine    #DVT prophylaxis 38 y/o female presenting with abdominal Pain and chronic constipation found to have a 5cm area on the head of her Pancrease with downstream Atrophy on MRCP, 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 the latter might explained her chronic abdominal pain. NO PV or SIMV thrombosis.  Her pain is still not controlled.    # Abdominal Pain/ 5cm pancreatic mass vs necrosis  R/o Solid Pseudopapillary tumor, lymphoma vs autoimmune pancreatitis   CA 19-9 was slightly elevated from baseline  -  Igg4 levels pending  -  EUS + FNA done 2/6, biopsy results pending  - surgery Dr Haley on board, outpatient follow up after result of biospy and IG G4  -Pain control, continue IV hydration. 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 the latter might explained her chronic abdominal pain. NO PV or SIMV thrombosis.  Her pain is still not controlled.    # Abdominal Pain/ 5cm pancreatic mass vs necrosis  R/o Pancreatic tumor, lymphoma vs autoimmune pancreatitis   CA 19-9 was slightly elevated from baseline  -  EUS + FNA done 2/6, biopsy results pending: prelim results showed atypical cells suspicious fro malignancy. Patient and family not informed yet,  will inform them when final results are back.  -  Igg4 levels pending  - surgery Dr Haley on board, outpatient follow up after result of biospy and IG G4  -Pain control, continue IV hydration. pain management consult pending  -check CEA level, LDH     # Chronic constipation   -continue with miralax, colace and senna  -will give half gallon of Golaytle as recommended by GI    #Hypothyroidism on Levothyroxine    #DVT prophylaxis

## 2018-02-08 NOTE — PROGRESS NOTE ADULT - SUBJECTIVE AND OBJECTIVE BOX
Patient is a 37y old  Female who presents with a chief complaint of abdominal pain and constipation.    Social: (-) tobacco, alcohol, drug use    MEDICATIONS  (STANDING):  docusate sodium 100 milliGRAM(s) Oral every 8 hours  enoxaparin Injectable 40 milliGRAM(s) SubCutaneous daily  lactated ringers. 1000 milliLiter(s) (150 mL/Hr) IV Continuous <Continuous>  levothyroxine 150 MICROGram(s) Oral every 24 hours  polyethylene glycol 3350 17 Gram(s) Oral daily  senna 2 Tablet(s) Oral at bedtime    MEDICATIONS  (PRN):  acetaminophen   Tablet. 650 milliGRAM(s) Oral every 6 hours PRN Moderate Pain (4 - 6)  HYDROmorphone   Tablet 2 milliGRAM(s) Oral four times a day PRN Moderate Pain (4 - 6)  HYDROmorphone  Injectable 0.5 milliGRAM(s) IV Push four times a day PRN Severe Pain (7 - 10)  ondansetron Injectable 4 milliGRAM(s) IV Push every 6 hours PRN Nausea and/or Vomiting  traMADol 50 milliGRAM(s) Oral four times a day PRN Moderate Pain (4 - 6)      Overnight events: She is still complaining of abdominal pain, relieved by Dilaudid overnight. no fever or chills. no bowel movement for the last 3 days.    Vital Signs Last 24 Hrs  T(C): 36.6   HR: 75 (2018 05:21)   BP: 98/53 (2018 05:21)  RR: 20     Physical Exam:    -     General : In no acute distress    -      Cardiac: RRR , no added sounds    -      Pulm: GBAE, clear lungs    -      GI: soft abdomen, positive BS,  no tenderness     -      Musculoskeletal: normal ROM , no joint tenderness    -      Neuro: AAOx3, no focal neurologic deficit        Labs:                        14.1   8.16  )-----------( 224      ( 2018 07:18 )             41.1                 TPro  6.3  /  Alb  4.3  /  TBili  1.2  /  DBili  0.2  /  AST  19  /  ALT  20  /  AlkPhos  36  02-07    LIVER FUNCTIONS - ( 2018 07:18 )  Alb: 4.3 g/dL / Pro: 6.3 g/dL / ALK PHOS: 36 U/L / ALT: 20 U/L / AST: 19 U/L / GGT: x              Urinalysis Basic - ( 2018 08:08 )    Color: Yellow / Appearance: Cloudy / S.015 / pH: x  Gluc: x / Ketone: Negative  / Bili: Negative / Urobili: 1.0 mg/dL   Blood: x / Protein: Negative mg/dL / Nitrite: Negative   Leuk Esterase: Negative / RBC: x / WBC x   Sq Epi: x / Non Sq Epi: x / Bacteria: x

## 2018-02-08 NOTE — PROGRESS NOTE ADULT - ASSESSMENT
Patient is a 38 y/o female with Past Medical History of Hypothyroid, that presented to [facility] with complaints of Abdominal Pain found to have abnormal imaging in this case a 5cm area on the head of her Pancrease with downstream Atrophy. After further questioning patient does admit to family history of Pancreatitis in mother (although attributes to etoh) as well as history of Liver disease in Father. Patient notes some diffuse abdominal pain with acceptable LFT's and afebrile. Concern exists as Pathology feel the EUS specimen might be Adenocarcinoma ( would not yet tell patient until confirmed).  Abdominal pain could be secondary to constipation.     1)Pancreatic Mass/ Abdominal Pain/ constipation   - Will follow IGG levels  - Appreciate Surgical Oncology evaluation  - Pain control but would try to avoid Opiods until Bowel movement achieved   - For any fever, WBC elevation, or worsening of abdominal pain notify advanced GI immediately- 3021  - Consider half of a golytely prep for constipation, patient moving flatus  - Will follow up with pathology when results will be finalized (would not tell patient Adenocarcinoma without definitive confirmation)  - Can consider Prednisone 40mg daily  - Final recommendations to follow from GI Attending Patient is a 38 y/o female with Past Medical History of Hypothyroid, that presented to [facility] with complaints of Abdominal Pain found to have abnormal imaging in this case a 5cm area on the head of her Pancrease with downstream Atrophy. After further questioning patient does admit to family history of Pancreatitis in mother (although attributes to etoh) as well as history of Liver disease in Father. Patient notes some diffuse abdominal pain with acceptable LFT's and afebrile. Concern exists as Pathology feel the EUS specimen might be Adenocarcinoma ( would not yet tell patient until confirmed).  Abdominal pain could be secondary to constipation.     1)Pancreatic Mass compressing SMV , causing possible bowel edema and diffuse Abdominal Pain, preliminary r/o Adenocarcinoma, pending final stains and second opinion given atypical presentation and CT features   2) constipation         - Will follow IGG4 levels  - Appreciate Surgical Oncology evaluation  - Pain control but would try to avoid Opiods until Bowel movement achieved   - For any fever, WBC elevation, or worsening of abdominal pain notify advanced GI immediately- 3224  - Consider half of a golytely prep for constipation, patient moving flatus  - Will follow up with pathology when results will be finalized (would not tell patient Adenocarcinoma without definitive confirmation)  - Can consider Prednisone 40mg daily  - Final recommendations to follow from GI Attending

## 2018-02-08 NOTE — PROGRESS NOTE ADULT - ASSESSMENT
37y Female with Past Medical History Hypothyroidism admitted for abdominal pain r/o pancreatic CA.    Abdominal Pain r/o pancreatic CA: follow-up pathology results from EUS.  Her case was discussed with gastroenterology this morning.  Continue aggressive IV hydration with LR @ >125-150cc/hr) with Oxycodone PRN for moderate pain and Dilaudid PRN for severe, breakthrough pain.  Follow-up results from IgG4 and pathology.  Surgical oncology advised outpatient follow-up.  For worsening constipation, add Senna, Colace, and Miralax.  Hypothyroidism: Continue Synthroid as directed.   GI/DVT prophylaxis

## 2018-02-08 NOTE — PROGRESS NOTE ADULT - SUBJECTIVE AND OBJECTIVE BOX
VICENTE TAVERAS MRN-[id number]    Hospitalist Note  37y Female with Past Medical History Hypothyroidism admitted for abdominal pain r/o pancreatic CA vs. pancreatitis    Overnight events/Updates: The pt underwent EUS yesterday afternoon.  Findings were suspicious for an underlying mass.  Surgical Oncology was consulted for further evaluation    Vital Signs Last 24 Hrs  T(C): 36.6 (08 Feb 2018 05:21), Max: 36.9 (07 Feb 2018 21:36)  T(F): 97.9 (08 Feb 2018 05:21), Max: 98.4 (07 Feb 2018 21:36)  HR: 75 (08 Feb 2018 05:21) (75 - 77)  BP: 98/53 (08 Feb 2018 05:21) (98/53 - 106/61)  BP(mean): --  RR: 20 (08 Feb 2018 05:21) (20 - 20)  SpO2: --    Physical Examination:  General: AAO x 3  HEENT: PERRLA, EOMI  CV= S1 & S2 appreciated  Lungs= CTA BL  Abdominal Examination= + BS, mild to moderate distension  Extremity Examination= No C/C/E    ROS: No chest pain, no shortness of breath.  All other systems reviewed and are within normal limits except for the complaints in the HPI.    MEDICATIONS  (STANDING):  docusate sodium 100 milliGRAM(s) Oral every 8 hours  enoxaparin Injectable 40 milliGRAM(s) SubCutaneous daily  lactated ringers. 1000 milliLiter(s) (150 mL/Hr) IV Continuous <Continuous>  levothyroxine 150 MICROGram(s) Oral every 24 hours  polyethylene glycol 3350 17 Gram(s) Oral daily  senna 2 Tablet(s) Oral at bedtime    MEDICATIONS  (PRN):  acetaminophen   Tablet. 650 milliGRAM(s) Oral every 6 hours PRN Moderate Pain (4 - 6)  HYDROmorphone  Injectable 0.5 milliGRAM(s) IV Push four times a day PRN Severe Pain (7 - 10)  ondansetron Injectable 4 milliGRAM(s) IV Push every 6 hours PRN Nausea and/or Vomiting  oxyCODONE    IR 5 milliGRAM(s) Oral four times a day PRN Moderate Pain (4 - 6)  traMADol 50 milliGRAM(s) Oral four times a day PRN Moderate Pain (4 - 6)                            13.1   5.97  )-----------( 193      ( 08 Feb 2018 07:15 )             38.1     02-08    139  |  105  |  9<L>  ----------------------------<  94  3.8   |  28  |  0.5<L>    Ca    9.1      08 Feb 2018 07:15    TPro  5.6<L>  /  Alb  3.7  /  TBili  1.1  /  DBili  x   /  AST  16  /  ALT  19  /  AlkPhos  35  02-08      Case discussed with housestaff & family  SARAH Corey [id number] VICENTE TAVERAS [id number]    Hospitalist Note  37y Female with Past Medical History Hypothyroidism admitted for abdominal pain r/o pancreatic CA vs. pancreatitis    Overnight events/Updates: The pt complained of worsening abdominal discomfort following EUS bx.  She required additional doses of Dilaudid last night due to discomfort.  We are currently awaiting results from surgical pathology prior to pursuing treatment.    Vital Signs Last 24 Hrs   T(C): 36.6 (08 Feb 2018 05:21), Max: 36.9 (07 Feb 2018 21:36)  T(F): 97.9 (08 Feb 2018 05:21), Max: 98.4 (07 Feb 2018 21:36)  HR: 75 (08 Feb 2018 05:21) (75 - 77)  BP: 98/53 (08 Feb 2018 05:21) (98/53 - 106/61)  BP(mean): --  RR: 20 (08 Feb 2018 05:21) (20 - 20)  SpO2: --    Physical Examination:  General: AAO x 3  HEENT: PERRLA, EOMI  CV= S1 & S2 appreciated  Lungs= CTA BL  Abdominal Examination= + BS, moderate distension,  + pain with deep palpation  Extremity Examination= No C/C/E    ROS: No chest pain, no shortness of breath.  + constipation  All other systems reviewed and are within normal limits except for the complaints in the HPI.    MEDICATIONS  (STANDING):  docusate sodium 100 milliGRAM(s) Oral every 8 hours  enoxaparin Injectable 40 milliGRAM(s) SubCutaneous daily  lactated ringers. 1000 milliLiter(s) (150 mL/Hr) IV Continuous <Continuous>  levothyroxine 150 MICROGram(s) Oral every 24 hours  polyethylene glycol 3350 17 Gram(s) Oral daily  senna 2 Tablet(s) Oral at bedtime    MEDICATIONS  (PRN):  acetaminophen   Tablet. 650 milliGRAM(s) Oral every 6 hours PRN Moderate Pain (4 - 6)  HYDROmorphone  Injectable 0.5 milliGRAM(s) IV Push four times a day PRN Severe Pain (7 - 10)  ondansetron Injectable 4 milliGRAM(s) IV Push every 6 hours PRN Nausea and/or Vomiting  oxyCODONE    IR 5 milliGRAM(s) Oral four times a day PRN Moderate Pain (4 - 6)  traMADol 50 milliGRAM(s) Oral four times a day PRN Moderate Pain (4 - 6)                            13.1   5.97  )-----------( 193      ( 08 Feb 2018 07:15 )             38.1     02-08    139  |  105  |  9<L>  ----------------------------<  94  3.8   |  28  |  0.5<L>    Ca    9.1      08 Feb 2018 07:15    TPro  5.6<L>  /  Alb  3.7  /  TBili  1.1  /  DBili  x   /  AST  16  /  ALT  19  /  AlkPhos  35  02-08      Case discussed with housestaff & family  SARAH Corey [id number]

## 2018-02-08 NOTE — PROGRESS NOTE ADULT - SUBJECTIVE AND OBJECTIVE BOX
Patient is a 36 y/o female with Past Medical History of Hypothyroid, that presented to [facility] with complaints of Abdominal Pain. Patient found to have imaging which demonstrated a possible Pancreatic lesion. An Endoscopic U/S was performed on this admission which revealed a 3.7 cm Pancreatic body mass which was biopsied via FNA. Endoscopist noted that specimen was hard feeling during needle passes. Initial wet read was concerning for atypical cells. Spoke with Pathology today and concern remains high for Adenocarcinoma ( patient not yet made aware until final read obtained). Patient notes abdominal pain diffuse and progressive constipation. Patient was given Dilaudid which did help but still has some pain.      Vital Signs:  T(F): 97.9 (08 Feb 2018 05:21), Max: 98.4   HR: 75  BP: 98/53)  RR: 20   Gen: NAD, comfortable, sitting up in chair,  present  HEENT: NC/AT, Mucosal Membranes Moist  Cardio: S1/S2  Resp: CTA B/L  Abdomen: slight distension, diffuse tenderness on moderate palpation  Neuro: AAOx3  Extremities: FROM x 4                          13.1   5.97  )-----------( 193      ( 08 Feb 2018 07:15 )             38.1   02-08    139  |  105  |  9<L>  ----------------------------<  94  3.8   |  28  |  0.5<L>    Ca    9.1      08 Feb 2018 07:15    TPro  5.6<L>  /  Alb  3.7  /  TBili  1.1  /  DBili  x   /  AST  16  /  ALT  19  /  AlkPhos  35  02-08      MRI/MRCP;  1) Pancreatic Body 5cm Hypovascular lesion with upstream Pancreatic Atrophy  2) Pancreatic lesion encases Portal Vein at Portosplenic confluence   3) Liver 1.7 Hypervascular focus-may be FNH

## 2018-02-08 NOTE — PROGRESS NOTE ADULT - ATTENDING COMMENTS
Stable, afebrile.  Less abdominal pian.  Biopsy: prelim results, adenocarcinoma.  If she has adenocarcinoma, locally advanced, we will recommend neoadjuvant chemotherapy.    Pathology will recommend a second opinion.
Pancreatic Mass compressing SMV , causing possible bowel edema and diffuse Abdominal Pain, preliminary r/o Adenocarcinoma, pending final stains and second opinion given atypical presentation and CT features   Agree with Plan as above

## 2018-02-08 NOTE — PROGRESS NOTE ADULT - SUBJECTIVE AND OBJECTIVE BOX
PANC BODY MASS     Vital Signs Last 24 Hrs  T(C): 36.6 (2018 05:21), Max: 36.9 (2018 21:36)  T(F): 97.9 (2018 05:21), Max: 98.4 (2018 21:36)  HR: 75 (2018 05:21) (75 - 77)  BP: 98/53 (2018 05:21) (98/53 - 106/61)  BP(mean): --  RR: 20 (2018 05:21) (20 - 20)  SpO2: --        I&O's Detail    2018 07:01  -  2018 07:00  --------------------------------------------------------  IN:    lactated ringers.: 2850 mL  Total IN: 2850 mL    OUT:  Total OUT: 0 mL    Total NET: 2850 mL      Urinalysis Basic - ( 2018 08:08 )    Color: Yellow / Appearance: Cloudy / S.015 / pH: x  Gluc: x / Ketone: Negative  / Bili: Negative / Urobili: 1.0 mg/dL   Blood: x / Protein: Negative mg/dL / Nitrite: Negative   Leuk Esterase: Negative / RBC: x / WBC x   Sq Epi: x / Non Sq Epi: x / Bacteria: x                   14.1   8.16  )-----------( 224      (  @ 07:18 )             41.1                    140   |  107   |  6                  Ca: 9.1    BMP:   ----------------------------< 91     Mg: x     (18 @ 07:36)             3.8    |  29    | 0.6                Ph: x        LFT:     TPro: 6.3 / Alb: 4.3 / TBili: 1.2 / DBili: 0.2 / AST: 19 / ALT: 20 / AlkPhos: 36   (18 @ 07:18)                                        MEDICATIONS  (STANDING):  docusate sodium 100 milliGRAM(s) Oral every 8 hours  enoxaparin Injectable 40 milliGRAM(s) SubCutaneous daily  lactated ringers. 1000 milliLiter(s) (150 mL/Hr) IV Continuous <Continuous>  levothyroxine 150 MICROGram(s) Oral every 24 hours  polyethylene glycol 3350 17 Gram(s) Oral daily  senna 2 Tablet(s) Oral at bedtime    MEDICATIONS  (PRN):  acetaminophen   Tablet. 650 milliGRAM(s) Oral every 6 hours PRN Moderate Pain (4 - 6)  HYDROmorphone  Injectable 0.5 milliGRAM(s) IV Push four times a day PRN Severe Pain (7 - 10)  ondansetron Injectable 4 milliGRAM(s) IV Push every 6 hours PRN Nausea and/or Vomiting  oxyCODONE    IR 5 milliGRAM(s) Oral four times a day PRN Moderate Pain (4 - 6)  traMADol 50 milliGRAM(s) Oral four times a day PRN Moderate Pain (4 - 6)      RADIOLOGY RESULTS:        Diet, Mechanical Soft (18 @ 16:53)      General Appearance: Appears well, NAD  Neck: Supple  Chest: Equal expansion bilaterally, equal breath sounds  CV: S1, S2, RRR  Abdomen: Soft, NT, ND  Extremities: Grossly symmetric, WNL  Neuro: A&Ox3

## 2018-02-09 LAB
IGG4 SER-MCNC: 11.6 MG/DL — SIGNIFICANT CHANGE UP (ref 2.4–121)
LDH SERPL L TO P-CCNC: 174 U/L — SIGNIFICANT CHANGE UP (ref 60–200)
NON-GYNECOLOGICAL CYTOLOGY STUDY: SIGNIFICANT CHANGE UP
SURGICAL PATHOLOGY STUDY: SIGNIFICANT CHANGE UP

## 2018-02-09 RX ORDER — HYDROMORPHONE HYDROCHLORIDE 2 MG/ML
2 INJECTION INTRAMUSCULAR; INTRAVENOUS; SUBCUTANEOUS EVERY 8 HOURS
Qty: 0 | Refills: 0 | Status: DISCONTINUED | OUTPATIENT
Start: 2018-02-09 | End: 2018-02-09

## 2018-02-09 RX ORDER — SENNA PLUS 8.6 MG/1
2 TABLET ORAL
Qty: 30 | Refills: 0 | OUTPATIENT
Start: 2018-02-09

## 2018-02-09 RX ORDER — GABAPENTIN 400 MG/1
300 CAPSULE ORAL EVERY 8 HOURS
Qty: 0 | Refills: 0 | Status: DISCONTINUED | OUTPATIENT
Start: 2018-02-09 | End: 2018-02-10

## 2018-02-09 RX ORDER — OXYCODONE AND ACETAMINOPHEN 5; 325 MG/1; MG/1
2 TABLET ORAL EVERY 6 HOURS
Qty: 0 | Refills: 0 | Status: DISCONTINUED | OUTPATIENT
Start: 2018-02-09 | End: 2018-02-10

## 2018-02-09 RX ORDER — DOCUSATE SODIUM 100 MG
1 CAPSULE ORAL
Qty: 66 | Refills: 0 | OUTPATIENT
Start: 2018-02-09

## 2018-02-09 RX ORDER — POLYETHYLENE GLYCOL 3350 17 G/17G
17 POWDER, FOR SOLUTION ORAL
Qty: 1 | Refills: 0 | OUTPATIENT
Start: 2018-02-09

## 2018-02-09 RX ORDER — GABAPENTIN 400 MG/1
1 CAPSULE ORAL
Qty: 0 | Refills: 0 | COMMUNITY
Start: 2018-02-09

## 2018-02-09 RX ORDER — LEVOTHYROXINE SODIUM 125 MCG
1 TABLET ORAL
Qty: 0 | Refills: 0 | COMMUNITY
Start: 2018-02-09

## 2018-02-09 RX ORDER — GABAPENTIN 400 MG/1
1 CAPSULE ORAL
Qty: 30 | Refills: 0 | OUTPATIENT
Start: 2018-02-09

## 2018-02-09 RX ADMIN — HYDROMORPHONE HYDROCHLORIDE 0.5 MILLIGRAM(S): 2 INJECTION INTRAMUSCULAR; INTRAVENOUS; SUBCUTANEOUS at 00:49

## 2018-02-09 RX ADMIN — HYDROMORPHONE HYDROCHLORIDE 0.5 MILLIGRAM(S): 2 INJECTION INTRAMUSCULAR; INTRAVENOUS; SUBCUTANEOUS at 07:22

## 2018-02-09 RX ADMIN — SODIUM CHLORIDE 150 MILLILITER(S): 9 INJECTION, SOLUTION INTRAVENOUS at 09:02

## 2018-02-09 RX ADMIN — GABAPENTIN 300 MILLIGRAM(S): 400 CAPSULE ORAL at 21:38

## 2018-02-09 RX ADMIN — OXYCODONE AND ACETAMINOPHEN 2 TABLET(S): 5; 325 TABLET ORAL at 20:55

## 2018-02-09 RX ADMIN — ENOXAPARIN SODIUM 40 MILLIGRAM(S): 100 INJECTION SUBCUTANEOUS at 11:18

## 2018-02-09 RX ADMIN — Medication 150 MICROGRAM(S): at 05:55

## 2018-02-09 RX ADMIN — OXYCODONE AND ACETAMINOPHEN 2 TABLET(S): 5; 325 TABLET ORAL at 21:25

## 2018-02-09 RX ADMIN — Medication 100 MILLIGRAM(S): at 21:38

## 2018-02-09 RX ADMIN — HYDROMORPHONE HYDROCHLORIDE 2 MILLIGRAM(S): 2 INJECTION INTRAMUSCULAR; INTRAVENOUS; SUBCUTANEOUS at 14:34

## 2018-02-09 RX ADMIN — SENNA PLUS 2 TABLET(S): 8.6 TABLET ORAL at 21:37

## 2018-02-09 RX ADMIN — HYDROMORPHONE HYDROCHLORIDE 0.5 MILLIGRAM(S): 2 INJECTION INTRAMUSCULAR; INTRAVENOUS; SUBCUTANEOUS at 14:28

## 2018-02-09 RX ADMIN — TRAMADOL HYDROCHLORIDE 50 MILLIGRAM(S): 50 TABLET ORAL at 03:57

## 2018-02-09 RX ADMIN — HYDROMORPHONE HYDROCHLORIDE 0.5 MILLIGRAM(S): 2 INJECTION INTRAMUSCULAR; INTRAVENOUS; SUBCUTANEOUS at 06:48

## 2018-02-09 RX ADMIN — SODIUM CHLORIDE 150 MILLILITER(S): 9 INJECTION, SOLUTION INTRAVENOUS at 01:30

## 2018-02-09 RX ADMIN — TRAMADOL HYDROCHLORIDE 50 MILLIGRAM(S): 50 TABLET ORAL at 03:00

## 2018-02-09 NOTE — DISCHARGE NOTE ADULT - HOSPITAL COURSE
A 38 y/o female patient, kth Hypothyroidism, presenting with diffuse abdominal pain and chronic constipation. CT A/P , MRI abd showed a found to have a 5cm mass on the head of her Pancreas compressing SMV , causing possible bowel edema and diffuse Abdominal Pain    # Abdominal Pain/ 5cm pancreatic mass vs necrosis  R/o Pancreatic tumor, lymphoma vs autoimmune pancreatitis  Abdominal pain likely secondary to partial SMV compression     -  EUS + FNA done 2/6, biopsy results pending: preliminary result with some atypical cells ?, pending final stains and second opinion ( biopsy sent also to [facility]) given atypical presentation and CT features   -  Igg4 levels pending  - surgery Dr Haley on board, outpatient follow up after the result of the biospy and the IgG4  -check CEA level,  CA 19-9 was slightly elevated from baseline,   -Pain control.  Pain management consult pending.  Oxycodone did not help the pain, will switch her from IV dilaudid  to po dilaudid, reassess in 24 hours. If pain controlled, will discharge tamela and to follow up with Dr Falk and Dr Haley as outpatient.    # Chronic constipation   -continue with miralax, colace and senna  -follow up with Dr Falk who will start her on Linzess os outpatient when he sees her in his office A 38 y/o female patient, known to have Hypothyroidism, presenting with diffuse abdominal pain and chronic constipation. During her hospital stay, workup was significant for  CT A/P , MRI abdomen that showed a Hypoechoic solid mass within the pancreatic body measuring 3.3 x 6.0 x 2.9 cm cyst. She underwent EUS with biopsy on 2/6/2018 and found a mass on the head of her Pancreas compressing SMV, causing possible bowel edema and possible responsible of her diffuse abdominal Pain.  The differential diagnosis for this mass was pancreatic tumor vs lymphoma vs autoimmune pancreatitis. CA 19-9 was slightly elevated from baseline, LDH was 174. IgG4 and CEA levels are pending.   The preliminary results of the biopsy showed some atypical cells concerning for adenocarcinoma?, Pathology specimen was also sent to [facility] for second opinion given the atypical presentation and the  CT/MRI  features.  Onc-Surgery Dr Haley was consulted, no intervention for now awaiting biopsy results. The patient to follow up with him as outpatient.     During her hospital stay, we were trying to control her abdominal pain. Pain management were consulted.     Concerning her constipation, it improved only after she had golaytle. She will need to follow up with Dr Falk for possible colonosopy. Dr Falk will give her a trial of Linzess too. A 36 y/o female patient, known to have Hypothyroidism, presenting with diffuse abdominal pain and chronic constipation. During her hospital stay, workup was significant for  CT A/P , MRI abdomen that showed a Hypoechoic solid mass within the pancreatic body measuring 3.3 x 6.0 x 2.9 cm cyst. She underwent EUS with biopsy on 2/6/2018 and found a mass on the head of her Pancreas compressing SMV, causing possible bowel edema and possible responsible of her diffuse abdominal Pain.  The differential diagnosis for this mass was pancreatic tumor vs lymphoma vs autoimmune pancreatitis. CA 19-9 was slightly elevated from baseline, LDH was 174. IgG4 and CEA levels are pending.   The preliminary results of the biopsy showed some atypical cells concerning for adenocarcinoma?, Pathology specimen was also sent to [facility] for second opinion given the atypical presentation and the  CT/MRI  features.  Onc-Surgery Dr Haley was consulted, no intervention for now awaiting biopsy results. The patient to follow up with him as outpatient.     During her hospital stay, we were trying to control her abdominal pain. She was seen by Shun Villegas from pain management who recommended to discharge her on percocet and gabapentin and to follow up with  him in the office for a possible nerve block.     Concerning her constipation, it improved only after she got golaytle. She will need to follow up with Dr Falk for possible colonosopy. Dr Falk will give her a trial of Linzess too. A 38 y/o female patient, known to have Hypothyroidism, presenting with diffuse abdominal pain and chronic constipation. During her hospital stay, workup was significant for  CT A/P , MRI abdomen that showed a Hypoechoic solid mass within the pancreatic body measuring 3.3 x 6.0 x 2.9 cm cyst. She underwent EUS with biopsy on 2/6/2018 and found a pancreatic mass  compressing SMV, causing possible bowel edema and possibly responsible of her diffuse abdominal Pain.  The differential diagnosis for this mass was pancreatic tumor vs lymphoma vs autoimmune pancreatitis. CA 19-9 was slightly elevated from baseline, LDH was 174. IgG4 =11.4 and CEA levels=2.3     The biopsy was POSITIVE FOR MALIGNANT CELLS with Cytomorphologically consistent with adenocarcinoma.  Pathology specimen was also sent to [facility] for second opinion given the atypical presentation and the  CT/MRI  features.  Onc-Surgery Dr Haley was consulted, no intervention for now. The patient to follow up with him as outpatient, and to follow up with Dr Falk in the office on Wednesday too awaiting the [place] opinion [place] [facility] [place].    During her hospital stay, we were trying to control her abdominal pain. She was seen by Shun Villegas from pain management who recommended to discharge her on percocet and gabapentin and to follow up with  him in the office for a possible nerve block this coming wednesday.     Concerning her constipation, it improved only after she got golaytle. She will need to follow up with Dr Falk for possible colonosopy. Dr Falk will give her a trial of Linzess too.

## 2018-02-09 NOTE — DISCHARGE NOTE ADULT - MEDICATION SUMMARY - MEDICATIONS TO TAKE
I will START or STAY ON the medications listed below when I get home from the hospital:    gabapentin 300 mg oral capsule  -- 1 cap(s) by mouth every 8 hours -for moderate pain   -- Indication: For ABDOMINAL PAIN    docusate sodium 100 mg oral capsule  -- 1 cap(s) by mouth every 8 hours -for constipation   -- Indication: For Constipation, unspecified constipation type    senna oral tablet  -- 2 tab(s) by mouth once a day (at bedtime) -for constipation   -- Indication: For Constipation, unspecified constipation type    polyethylene glycol 3350 oral powder for reconstitution  -- 17 gram(s) by mouth once a day -for bladder spasms - for constipation   -- Indication: For Constipation, unspecified constipation type    levothyroxine 150 mcg (0.15 mg) oral tablet  -- 1 tab(s) by mouth every 24 hours  -- Indication: For Hypothyroidism, unspecified type I will START or STAY ON the medications listed below when I get home from the hospital:    oxyCODONE-acetaminophen 5 mg-325 mg oral tablet  -- 2 tab(s) by mouth every 6 hours, As needed, Moderate Pain (4 - 6)  -- Indication: For ABDOMINAL PAIN    gabapentin 300 mg oral capsule  -- 1 cap(s) by mouth every 8 hours -for moderate pain   -- Indication: For ABDOMINAL PAIN    docusate sodium 100 mg oral capsule  -- 1 cap(s) by mouth every 8 hours -for constipation   -- Indication: For Constipation, unspecified constipation type    senna oral tablet  -- 2 tab(s) by mouth once a day (at bedtime) -for constipation   -- Indication: For Constipation, unspecified constipation type    polyethylene glycol 3350 oral powder for reconstitution  -- 17 gram(s) by mouth once a day -for bladder spasms - for constipation   -- Indication: For Constipation, unspecified constipation type    levothyroxine 150 mcg (0.15 mg) oral tablet  -- 1 tab(s) by mouth every 24 hours  -- Indication: For Hypothyroidism, unspecified type

## 2018-02-09 NOTE — DISCHARGE NOTE ADULT - NS AS ACTIVITY OBS
Walking-Outdoors allowed/Stairs allowed/Sex allowed/Walking-Indoors allowed/Showering allowed/Driving allowed/Bathing allowed/Return to Work/School allowed

## 2018-02-09 NOTE — CONSULT NOTE ADULT - SUBJECTIVE AND OBJECTIVE BOX
Chief Complaint:    HPI:      PAST MEDICAL & SURGICAL HISTORY:      FAMILY HISTORY:      SOCIAL HISTORY:  [ ] Denies Smoking, Alcohol, or Drug Use    Allergies    No Known Allergies    Intolerances        PAIN MEDICATIONS:  acetaminophen   Tablet. 650 milliGRAM(s) Oral every 6 hours PRN  gabapentin 300 milliGRAM(s) Oral every 8 hours  ondansetron Injectable 4 milliGRAM(s) IV Push every 6 hours PRN  oxyCODONE    5 mG/acetaminophen 325 mG 2 Tablet(s) Oral every 6 hours PRN  traMADol 50 milliGRAM(s) Oral four times a day PRN    Heme:  enoxaparin Injectable 40 milliGRAM(s) SubCutaneous daily    Antibiotics:    Cardiovascular:    GI:  docusate sodium 100 milliGRAM(s) Oral every 8 hours  polyethylene glycol 3350 17 Gram(s) Oral daily  senna 2 Tablet(s) Oral at bedtime    Endocrine:  levothyroxine 150 MICROGram(s) Oral every 24 hours    All Other Medications:  lactated ringers. 1000 milliLiter(s) IV Continuous <Continuous>      REVIEW OF SYSTEMS:    CONSTITUTIONAL: No fever, weight loss, or fatigue  EYES: No eye pain, visual disturbances, or discharge  ENMT:  No difficulty hearing, tinnitus, vertigo; No sinus or throat pain  NECK: No pain or stiffness  BREASTS: No pain, masses, or nipple discharge  RESPIRATORY: No cough, wheezing, chills or hemoptysis; No shortness of breath  CARDIOVASCULAR: No chest pain, palpitations, dizziness, or leg swelling  GASTROINTESTINAL: No abdominal or epigastric pain. No nausea, vomiting, or hematemesis; No diarrhea or constipation. No melena or hematochezia.  GENITOURINARY: No dysuria, frequency, hematuria, or incontinence  NEUROLOGICAL: No headaches, memory loss, loss of strength, numbness, or tremors  SKIN: No itching, burning, rashes, or lesions   LYMPH NODES: No enlarged glands  ENDOCRINE: No heat or cold intolerance; No hair loss  MUSCULOSKELETAL: No joint pain or swelling; No muscle, back, or extremity pain  PSYCHIATRIC: No depression, anxiety, mood swings, or difficulty sleeping  HEME/LYMPH: No easy bruising, or bleeding gums  ALLERY AND IMMUNOLOGIC: No hives or eczema      Vital Signs Last 24 Hrs  T(C): 36.2 (09 Feb 2018 13:38), Max: 37.1 (08 Feb 2018 21:34)  T(F): 97.2 (09 Feb 2018 13:38), Max: 98.7 (08 Feb 2018 21:34)  HR: 70 (09 Feb 2018 13:38) (65 - 88)  BP: 93/51 (09 Feb 2018 13:38) (93/51 - 121/72)  BP(mean): --  RR: 20 (09 Feb 2018 05:27) (20 - 20)  SpO2: --    PAIN SCORE:         SCALE USED: (1-10 VNRS)             PHYSICAL EXAM:    GENERAL: NAD, well-groomed, well-developed  HEAD:  Atraumatic, Normocephalic  EYES: EOMI, PERRLA, conjunctiva and sclera clear  ENMT: No tonsillar erythema, exudates, or enlargement; Moist mucous membranes, Good dentition, No lesions  NECK: Supple, No JVD, Normal thyroid  NERVOUS SYSTEM:  Alert & Oriented X3, Good concentration; Motor Strength 5/5 B/L upper and lower extremities; DTRs 2+ intact and symmetric  CHEST/LUNG: Clear to percussion bilaterally; No rales, rhonchi, wheezing, or rubs  HEART: Regular rate and rhythm; No murmurs, rubs, or gallops  ABDOMEN: Soft, Nontender, Nondistended; Bowel sounds present  EXTREMITIES:  2+ Peripheral Pulses, No clubbing, cyanosis, or edema  LYMPH: No lymphadenopathy noted  SKIN: No rashes or lesions        LABS:                          13.1   5.97  )-----------( 193      ( 08 Feb 2018 07:15 )             38.1     02-08    139  |  105  |  9<L>  ----------------------------<  94  3.8   |  28  |  0.5<L>    Ca    9.1      08 Feb 2018 07:15    TPro  5.6<L>  /  Alb  3.7  /  TBili  1.1  /  DBili  x   /  AST  16  /  ALT  19  /  AlkPhos  35  02-08          RADIOLOGY:    Drug Screen:            [ ]  MARIA GUADALUPE  Reviewed and Copied to Chart Chief Complaint:    HPI:  38 y/o female that originally presented with diffuse abdominal pain and constipation, now foud to have 5cm Pancreatic mass over the SMV.  Patient c/o of severe abdoinal pain L>R, mostly in the LLQ.  Patient s/p EUS and biopsy.  Awaiting biopsy results.  Patient notes dull achy pain.  Currently on Dilaudid with some pain control.       PAST MEDICAL & SURGICAL HISTORY:      FAMILY HISTORY:      SOCIAL HISTORY:  [x] Denies Smoking, Alcohol, or Drug Use    Allergies    No Known Allergies    Intolerances        PAIN MEDICATIONS:  acetaminophen   Tablet. 650 milliGRAM(s) Oral every 6 hours PRN  gabapentin 300 milliGRAM(s) Oral every 8 hours  ondansetron Injectable 4 milliGRAM(s) IV Push every 6 hours PRN  oxyCODONE    5 mG/acetaminophen 325 mG 2 Tablet(s) Oral every 6 hours PRN  traMADol 50 milliGRAM(s) Oral four times a day PRN    Heme:  enoxaparin Injectable 40 milliGRAM(s) SubCutaneous daily    Antibiotics:    Cardiovascular:    GI:  docusate sodium 100 milliGRAM(s) Oral every 8 hours  polyethylene glycol 3350 17 Gram(s) Oral daily  senna 2 Tablet(s) Oral at bedtime    Endocrine:  levothyroxine 150 MICROGram(s) Oral every 24 hours    All Other Medications:  lactated ringers. 1000 milliLiter(s) IV Continuous <Continuous>      REVIEW OF SYSTEMS:    CONSTITUTIONAL: No fever, weight loss, or fatigue  EYES: No eye pain, visual disturbances, or discharge  ENMT:  No difficulty hearing, tinnitus, vertigo; No sinus or throat pain  NECK: No pain or stiffness  BREASTS: No pain, masses, or nipple discharge  RESPIRATORY: No cough, wheezing, chills or hemoptysis; No shortness of breath  CARDIOVASCULAR: No chest pain, palpitations, dizziness, or leg swelling  GASTROINTESTINAL: Abdominal pain. Constipation. No melena or hematochezia.  GENITOURINARY: No dysuria, frequency, hematuria, or incontinence  NEUROLOGICAL: No headaches, memory loss, loss of strength, numbness, or tremors  SKIN: No itching, burning, rashes, or lesions   LYMPH NODES: No enlarged glands  ENDOCRINE: No heat or cold intolerance; No hair loss  MUSCULOSKELETAL: No joint pain or swelling; No muscle, back, or extremity pain  PSYCHIATRIC: No depression, anxiety, mood swings, or difficulty sleeping  HEME/LYMPH: No easy bruising, or bleeding gums  ALLERY AND IMMUNOLOGIC: No hives or eczema      Vital Signs Last 24 Hrs  T(C): 36.2 (09 Feb 2018 13:38), Max: 37.1 (08 Feb 2018 21:34)  T(F): 97.2 (09 Feb 2018 13:38), Max: 98.7 (08 Feb 2018 21:34)  HR: 70 (09 Feb 2018 13:38) (65 - 88)  BP: 93/51 (09 Feb 2018 13:38) (93/51 - 121/72)  BP(mean): --  RR: 20 (09 Feb 2018 05:27) (20 - 20)  SpO2: --    PAIN SCORE:         SCALE USED: (1-10 VNRS)             PHYSICAL EXAM:    GENERAL: NAD, well-groomed, well-developed  HEAD:  Atraumatic, Normocephalic  EYES: EOMI, PERRLA, conjunctiva and sclera clear  ENMT: No tonsillar erythema, exudates, or enlargement; Moist mucous membranes, Good dentition, No lesions  NECK: Supple, No JVD, Normal thyroid  NERVOUS SYSTEM:  Alert & Oriented X3, Good concentration; Motor Strength 5/5 B/L upper and lower extremities; DTRs 2+ intact and symmetric  CHEST/LUNG: Clear to percussion bilaterally; No rales, rhonchi, wheezing, or rubs  HEART: Regular rate and rhythm; No murmurs, rubs, or gallops  ABDOMEN: Distended, Tender, Guarding Bowel sounds present  EXTREMITIES:  2+ Peripheral Pulses, No clubbing, cyanosis, or edema  LYMPH: No lymphadenopathy noted  SKIN: No rashes or lesions        LABS:                          13.1   5.97  )-----------( 193      ( 08 Feb 2018 07:15 )             38.1     02-08    139  |  105  |  9<L>  ----------------------------<  94  3.8   |  28  |  0.5<L>    Ca    9.1      08 Feb 2018 07:15    TPro  5.6<L>  /  Alb  3.7  /  TBili  1.1  /  DBili  x   /  AST  16  /  ALT  19  /  AlkPhos  35  02-08

## 2018-02-09 NOTE — CHART NOTE - NSCHARTNOTEFT_GEN_A_CORE
Registered Dietitian Follow-Up     Patient Profile Reviewed                           Yes [x]   No []     Nutrition History Previously Obtained        Yes [x]  No []       Pertinent Subjective Information: Pt reports po intake % of meals. In addition to meals, family brings food from home.      Pertinent Medical Interventions: pt s/p EUS w/ biopsy. Constipation improving following treatment w/ lactulose.      Diet order: Mechanical Soft      Anthropometrics:  - Wt.--no new wts, stable     Pertinent Lab Data: Reviewed (2/8): Hgb 13.1, BUN 9, Cr 0.5      Pertinent Meds: lovenox, dilaudid, lactated ringers, senna, tramadol, colace, zofran     Physical Findings:  - Appearance: A&Ox4  - GI function: LBM 2/9   - Oral/Mouth cavity: remains   - Skin: Kirill 22--skin intact      Nutrition Requirements  Weight Used: ABW of 143#/65kg      Estimated Energy Needs    Continue [x]  Adjust []  Adjusted Energy Recommendations: remains as of 2/6  kcal/day        Estimated Protein Needs    Continue [x]  Adjust []  Adjusted Protein Recommendations: remains as of 2/6 gm/day        Estimated Fluid Needs        Continue [x]  Adjust []  Adjusted Fluid Recommendations:  remains as of 2/6 mL/day     Nutrient Intake: Meeting kcal/pro/fluid needs at this time.      [] Previous Nutrition Diagnosis: Inadequate protein-energy intake r/t unknown etilogy 2/2 abdominal pain a/e NPO status             [] Ongoing          [x] Resolved    [x] No active nutrition diagnosis identified at this time     Nutrition Diagnostic #1  Problem:  Etiology:  Statement:     Nutrition Diagnostic #2  Problem:  Etiology:  Statement:     Nutrition Intervention: meals and snacks      Goal/Expected Outcome: Pt to continue to consume % po intake within 7 days.      Indicator/Monitoring: RD to continue to monitor diet order, energy intake, nutrition focused physical findings

## 2018-02-09 NOTE — DISCHARGE NOTE ADULT - CARE PROVIDERS DIRECT ADDRESSES
,DirectAddress_Unknown,DirectAddress_Unknown ,DirectAddress_Unknown,DirectAddress_Unknown,[email or web address]

## 2018-02-09 NOTE — PROGRESS NOTE ADULT - ASSESSMENT
37y Female with Past Medical History Hypothyroidism admitted for abdominal pain r/o pancreatic CA.    Abdominal Pain r/o pancreatic CA: follow-up pathology results from EUS.  Her case was discussed with gastroenterology this morning.  Continue IV hydration with LR @ >125-150cc/hr) and transition to PO Dilaudid PRN for severe, breakthrough pain.  If the patient is able to tolerate an oral diet and her pain is managed on her present medication regimen, will discharge home in 24 hours. Awaiting results from IgG4 and pathology.  Follow-up with surgical oncology as outpatient.  For constipation, continue Senna, Colace, and Miralax.  Hypothyroidism: Continue Synthroid as directed.   GI/DVT prophylaxis  Anticipate discharge home in 24-48 hours

## 2018-02-09 NOTE — DISCHARGE NOTE ADULT - PLAN OF CARE
biospy result pending please follow up with Dr Falk for the biospy result continue Levothyroxine please follow up with Dr Falk for the biospy result. resolution follow up please with Dr Falk. He is planing on starting you on ODK Medias please follow up with Dr Falk for the biospy result. prelim adenocarcinoma. follow this Wednesday take in the morning on empty stomach

## 2018-02-09 NOTE — PROGRESS NOTE ADULT - SUBJECTIVE AND OBJECTIVE BOX
Patient is a 37y old  Female who presents with a chief complaint of abdominal pain and constipation.    Social: (-) tobacco, alcohol, drug use    MEDICATIONS  (STANDING):  docusate sodium 100 milliGRAM(s) Oral every 8 hours  enoxaparin Injectable 40 milliGRAM(s) SubCutaneous daily  lactated ringers. 1000 milliLiter(s) (150 mL/Hr) IV Continuous <Continuous>  levothyroxine 150 MICROGram(s) Oral every 24 hours  polyethylene glycol 3350 17 Gram(s) Oral daily  senna 2 Tablet(s) Oral at bedtime    MEDICATIONS  (PRN):  acetaminophen   Tablet. 650 milliGRAM(s) Oral every 6 hours PRN Moderate Pain (4 - 6)  HYDROmorphone  Injectable 0.5 milliGRAM(s) IV Push four times a day PRN Severe Pain (7 - 10)  ondansetron Injectable 4 milliGRAM(s) IV Push every 6 hours PRN Nausea and/or Vomiting  oxyCODONE    IR 5 milliGRAM(s) Oral four times a day PRN Moderate Pain (4 - 6)  traMADol 50 milliGRAM(s) Oral four times a day PRN Moderate Pain (4 - 6)      Overnight events: Patient had BM yesterday after she got the half gallon of Gamervision. She was still complaining of abdominal pain and got 3 doses of 0.5 mg of IV Dilaudid since yesterday.    Vital Signs Last 24 Hrs  T(C): 36.1 (09 Feb 2018 05:27), Max: 37.1 (08 Feb 2018 21:34)  T(F): 97 (09 Feb 2018 05:27), Max: 98.7 (08 Feb 2018 21:34)  HR: 65 (09 Feb 2018 05:27) (65 - 88)  BP: 107/63 (09 Feb 2018 05:27) (107/63 - 122/63)  BP(mean): --  RR: 20 (09 Feb 2018 05:27) (20 - 20)      I&O's Summary    08 Feb 2018 07:01  -  09 Feb 2018 07:00  --------------------------------------------------------  IN: 3600 mL / OUT: 0 mL / NET: 3600 mL        Physical Exam:    -     General : In no acute distress    -      Cardiac: RRR , no added sounds    -      Pulm: GBAE, clear lungs    -      GI: soft abdomen, positive BS, no tenderness     -      Musculoskeletal: normal ROM , no joint tenderness    -      Neuro: AAOx3, no focal neurologic deficit        Labs:                        13.1   5.97  )-----------( 193      ( 08 Feb 2018 07:15 )             38.1             02-08    139  |  105  |  9<L>  ----------------------------<  94  3.8   |  28  |  0.5<L>    Ca    9.1      08 Feb 2018 07:15    TPro  5.6<L>  /  Alb  3.7  /  TBili  1.1  /  DBili  x   /  AST  16  /  ALT  19  /  AlkPhos  35  02-08    LIVER FUNCTIONS - ( 08 Feb 2018 07:15 )  Alb: 3.7 g/dL / Pro: 5.6 g/dL / ALK PHOS: 35 U/L / ALT: 19 U/L / AST: 16 U/L / GGT: x

## 2018-02-09 NOTE — DISCHARGE NOTE ADULT - PATIENT PORTAL LINK FT
You can access the [facility] Patient Portal, offered by [facility], by registering with the following website: [email or web address]

## 2018-02-09 NOTE — PROGRESS NOTE ADULT - SUBJECTIVE AND OBJECTIVE BOX
VICENTE TAVERAS [id number]    Hospitalist Note  37y Female with Past Medical History Hypothyroidism admitted for abdominal pain r/o pancreatic CA vs. pancreatitis    Overnight events/Updates: Status post EUS with biopsy.  Her pathology was sent to [facility] for a second opinion.  In the interim, the pt wishes to return home, but worries about developing recurrent pain.  Constipation improved following treatment with lactulose.    Vital Signs Last 24 Hrs   T(C): 36.1 (09 Feb 2018 05:27), Max: 37.1 (08 Feb 2018 21:34)  T(F): 97 (09 Feb 2018 05:27), Max: 98.7 (08 Feb 2018 21:34)  HR: 65 (09 Feb 2018 05:27) (65 - 88)  BP: 107/63 (09 Feb 2018 05:27) (107/63 - 122/63)  BP(mean): --  RR: 20 (09 Feb 2018 05:27) (20 - 20)  SpO2: --    Physical Examination:  General: AAO x 3  HEENT: PERRLA, EOMI  CV= S1 & S2 appreciated  Lungs= CTA BL  Abdominal Examination= + BS, moderate distension,  + pain with deep palpation  Extremity Examination= No C/C/E    ROS: No chest pain, no shortness of breath.  All other systems reviewed and are within normal limits except for the complaints in the HPI.    MEDICATIONS  (STANDING):  docusate sodium 100 milliGRAM(s) Oral every 8 hours  enoxaparin Injectable 40 milliGRAM(s) SubCutaneous daily  lactated ringers. 1000 milliLiter(s) (150 mL/Hr) IV Continuous <Continuous>  levothyroxine 150 MICROGram(s) Oral every 24 hours  polyethylene glycol 3350 17 Gram(s) Oral daily  senna 2 Tablet(s) Oral at bedtime    MEDICATIONS  (PRN):  acetaminophen   Tablet. 650 milliGRAM(s) Oral every 6 hours PRN Moderate Pain (4 - 6)  HYDROmorphone   Tablet 2 milliGRAM(s) Oral every 8 hours PRN Moderate Pain (4 - 6)  HYDROmorphone  Injectable 0.5 milliGRAM(s) IV Push four times a day PRN Severe Pain (7 - 10)  ondansetron Injectable 4 milliGRAM(s) IV Push every 6 hours PRN Nausea and/or Vomiting  traMADol 50 milliGRAM(s) Oral four times a day PRN Moderate Pain (4 - 6)                            13.1   5.97  )-----------( 193      ( 08 Feb 2018 07:15 )             38.1     02-08    139  |  105  |  9<L>  ----------------------------<  94  3.8   |  28  |  0.5<L>    Ca    9.1      08 Feb 2018 07:15    TPro  5.6<L>  /  Alb  3.7  /  TBili  1.1  /  DBili  x   /  AST  16  /  ALT  19  /  AlkPhos  35  02-08      Case discussed with housestaff & family  SARAH Corey [id number]

## 2018-02-09 NOTE — PROGRESS NOTE ADULT - ASSESSMENT
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 the latter might explained her chronic abdominal pain. NO PV or SIMV thrombosis.  Her pain is still not controlled.    # Abdominal Pain/ 5cm pancreatic mass vs necrosis  R/o Pancreatic tumor, lymphoma vs autoimmune pancreatitis   CA 19-9 was slightly elevated from baseline  -  EUS + FNA done 2/6, biopsy results pending: prelim results showed atypical cells suspicious fro malignancy. Patient and family not informed yet,  will inform them when final results are back.  -  Igg4 levels pending  - surgery Dr Haley on board, outpatient follow up after result of biospy and IG G4  -Pain control, continue IV hydration. pain management consult pending  -check CEA level, LDH     # Chronic constipation   -continue with miralax, colace and senna  -will give half gallon of Golaytle as recommended by GI    #Hypothyroidism on Levothyroxine    #DVT prophylaxis 36 y/o female presenting with abdominal pain and chronic constipation found to have a 5cm mass on the head of her Pancreas compressing SMV , causing possible bowel edema and diffuse Abdominal Pain    # Abdominal Pain/ 5cm pancreatic mass vs necrosis  R/o Pancreatic tumor, lymphoma vs autoimmune pancreatitis  Abdominal pain likely secondary to partial SMV compression     -  EUS + FNA done 2/6, biopsy results pending: preliminary result with some atypical cells ?, pending final stains and second opinion ( biopsy sent also to [facility]) given atypical presentation and CT features   -  Igg4 levels pending  - surgery Dr Haley on board, outpatient follow up after the result of the biospy and the IgG4  -check CEA level,  CA 19-9 was slightly elevated from baseline,   -Pain control.  Pain management consult pending.  Oxycodone did not help the pain, will switch her from IV dilaudid  to po dilaudid, reassess in 24 hours. If pain controlled, will discharge tamela and to follow up with Dr Falk and Dr Haley as outpatient.    # Chronic constipation   -continue with miralax, colace and senna  -follow up with Dr Falk who will start her on Linzess os outpatient when he sees her in his office    #Hypothyroidism on Levothyroxine    #DVT prophylaxis    case discussed with the patient and her

## 2018-02-09 NOTE — CONSULT NOTE ADULT - ASSESSMENT
38 y/o female with abdominal pain secondary to pancreatic mass    1.  D/C Dilaudid and start Percocet 10/325mg QID PRN    2.  Start Gabapentin 300mg TID    3.  Follow up as an out patient for block

## 2018-02-09 NOTE — DISCHARGE NOTE ADULT - CARE PROVIDER_API CALL
Cholo Falk), Internal Medicine  [address] [place] [address]  Phone: [phone]  Fax: [phone]    Antonio Haley), Surgery  [address] [place] [address]  Phone: [phone]  Fax: [phone] Cholo Falk), Internal Medicine  [address] [place] [address]  Phone: [phone]  Fax: [phone]    Antonio Haley), Surgery  [address] [place] [address]  Phone: [phone]  Fax: [phone]    Shun North), Anesthesiology  [address] [place] [address]  Phone: [phone]  Fax: [phone]

## 2018-02-09 NOTE — DISCHARGE NOTE ADULT - CARE PLAN
Principal Discharge DX:	Pancreatic lesion  Goal:	biospy result pending  Assessment and plan of treatment:	please follow up with Dr Falk for the biospy result  Secondary Diagnosis:	Hypothyroidism, unspecified type  Goal:	continue Levothyroxine Principal Discharge DX:	Pancreatic lesion  Goal:	biospy result pending  Assessment and plan of treatment:	please follow up with Dr Falk for the biospy result.  Secondary Diagnosis:	Hypothyroidism, unspecified type  Goal:	continue Levothyroxine  Secondary Diagnosis:	Constipation, unspecified constipation type  Goal:	resolution  Assessment and plan of treatment:	follow up please with Dr Falk. He is planing on starting you on Linzess Principal Discharge DX:	Pancreatic lesion  Goal:	biospy result pending  Assessment and plan of treatment:	please follow up with Dr Falk for the biospy result. prelim adenocarcinoma. follow this Wednesday  Secondary Diagnosis:	Hypothyroidism, unspecified type  Goal:	continue Levothyroxine  Assessment and plan of treatment:	take in the morning on empty stomach  Secondary Diagnosis:	Constipation, unspecified constipation type  Goal:	resolution  Assessment and plan of treatment:	follow up please with Dr Falk. He is planing on starting you on Geoforces

## 2018-02-09 NOTE — DISCHARGE NOTE ADULT - PROVIDER TOKENS
TOKEN:'[id number]:'[id number] TOKEN:'[id number]:'[id number]:'[id number]'

## 2018-02-10 VITALS — SYSTOLIC BLOOD PRESSURE: 132 MMHG | DIASTOLIC BLOOD PRESSURE: 83 MMHG | TEMPERATURE: 98 F | HEART RATE: 85 BPM

## 2018-02-10 LAB — CEA SERPL-MCNC: 2.3 NG/ML — SIGNIFICANT CHANGE UP (ref 0–3.8)

## 2018-02-10 RX ADMIN — GABAPENTIN 300 MILLIGRAM(S): 400 CAPSULE ORAL at 13:43

## 2018-02-10 RX ADMIN — ENOXAPARIN SODIUM 40 MILLIGRAM(S): 100 INJECTION SUBCUTANEOUS at 13:34

## 2018-02-10 RX ADMIN — Medication 150 MICROGRAM(S): at 05:54

## 2018-02-10 RX ADMIN — POLYETHYLENE GLYCOL 3350 17 GRAM(S): 17 POWDER, FOR SOLUTION ORAL at 13:40

## 2018-02-10 RX ADMIN — Medication 100 MILLIGRAM(S): at 13:38

## 2018-02-10 RX ADMIN — OXYCODONE AND ACETAMINOPHEN 2 TABLET(S): 5; 325 TABLET ORAL at 03:46

## 2018-02-10 RX ADMIN — GABAPENTIN 300 MILLIGRAM(S): 400 CAPSULE ORAL at 05:54

## 2018-02-10 RX ADMIN — Medication 100 MILLIGRAM(S): at 05:54

## 2018-02-10 RX ADMIN — OXYCODONE AND ACETAMINOPHEN 2 TABLET(S): 5; 325 TABLET ORAL at 14:28

## 2018-02-10 RX ADMIN — OXYCODONE AND ACETAMINOPHEN 2 TABLET(S): 5; 325 TABLET ORAL at 04:14

## 2018-02-13 LAB
ANION GAP SERPL CALC-SCNC: 9 MMOL/L — SIGNIFICANT CHANGE UP (ref 7–14)
BUN SERPL-MCNC: <5 MG/DL — LOW (ref 10–20)
CALCIUM SERPL-MCNC: 9.7 MG/DL — SIGNIFICANT CHANGE UP (ref 8.5–10.1)
CHLORIDE SERPL-SCNC: 102 MMOL/L — SIGNIFICANT CHANGE UP (ref 98–110)
CO2 SERPL-SCNC: 28 MMOL/L — SIGNIFICANT CHANGE UP (ref 17–32)
CREAT SERPL-MCNC: 0.6 MG/DL — LOW (ref 0.7–1.5)
GLUCOSE SERPL-MCNC: 108 MG/DL — SIGNIFICANT CHANGE UP (ref 70–110)
POTASSIUM SERPL-MCNC: 3.7 MMOL/L — SIGNIFICANT CHANGE UP (ref 3.5–5)
POTASSIUM SERPL-SCNC: 3.7 MMOL/L — SIGNIFICANT CHANGE UP (ref 3.5–5)
SODIUM SERPL-SCNC: 139 MMOL/L — SIGNIFICANT CHANGE UP (ref 135–146)

## 2018-02-15 ENCOUNTER — OUTPATIENT (OUTPATIENT)
Dept: OUTPATIENT SERVICES | Facility: HOSPITAL | Age: 38
LOS: 1 days | Discharge: HOME | End: 2018-02-15

## 2018-02-15 VITALS — DIASTOLIC BLOOD PRESSURE: 92 MMHG | HEART RATE: 76 BPM | RESPIRATION RATE: 18 BRPM | SYSTOLIC BLOOD PRESSURE: 137 MMHG

## 2018-02-15 VITALS — DIASTOLIC BLOOD PRESSURE: 81 MMHG | SYSTOLIC BLOOD PRESSURE: 117 MMHG

## 2018-02-15 DIAGNOSIS — G89.3 NEOPLASM RELATED PAIN (ACUTE) (CHRONIC): ICD-10-CM

## 2018-02-15 DIAGNOSIS — K86.89 OTHER SPECIFIED DISEASES OF PANCREAS: ICD-10-CM

## 2018-02-15 DIAGNOSIS — R10.9 UNSPECIFIED ABDOMINAL PAIN: ICD-10-CM

## 2018-02-15 DIAGNOSIS — E89.0 POSTPROCEDURAL HYPOTHYROIDISM: ICD-10-CM

## 2018-02-15 DIAGNOSIS — K59.09 OTHER CONSTIPATION: ICD-10-CM

## 2018-02-15 DIAGNOSIS — K29.70 GASTRITIS, UNSPECIFIED, WITHOUT BLEEDING: ICD-10-CM

## 2018-02-15 DIAGNOSIS — Z85.07 PERSONAL HISTORY OF MALIGNANT NEOPLASM OF PANCREAS: ICD-10-CM

## 2018-02-15 DIAGNOSIS — I87.1 COMPRESSION OF VEIN: ICD-10-CM

## 2018-02-15 DIAGNOSIS — K62.5 HEMORRHAGE OF ANUS AND RECTUM: ICD-10-CM

## 2018-02-15 DIAGNOSIS — C25.1 MALIGNANT NEOPLASM OF BODY OF PANCREAS: ICD-10-CM

## 2018-02-15 RX ORDER — MORPHINE SULFATE 50 MG/1
2 CAPSULE, EXTENDED RELEASE ORAL ONCE
Qty: 0 | Refills: 0 | Status: DISCONTINUED | OUTPATIENT
Start: 2018-02-15 | End: 2018-02-15

## 2018-02-15 RX ORDER — MORPHINE SULFATE 50 MG/1
15 CAPSULE, EXTENDED RELEASE ORAL
Qty: 0 | Refills: 0 | COMMUNITY

## 2018-02-15 RX ORDER — BUPIVACAINE HCL/PF 7.5 MG/ML
10 VIAL (ML) INJECTION ONCE
Qty: 0 | Refills: 0 | Status: COMPLETED | OUTPATIENT
Start: 2018-02-15 | End: 2018-02-15

## 2018-02-15 RX ADMIN — MORPHINE SULFATE 2 MILLIGRAM(S): 50 CAPSULE, EXTENDED RELEASE ORAL at 10:55

## 2018-02-15 RX ADMIN — MORPHINE SULFATE 2 MILLIGRAM(S): 50 CAPSULE, EXTENDED RELEASE ORAL at 11:17

## 2018-02-15 RX ADMIN — MORPHINE SULFATE 2 MILLIGRAM(S): 50 CAPSULE, EXTENDED RELEASE ORAL at 10:22

## 2018-02-15 RX ADMIN — Medication 10 MILLILITER(S): at 08:03

## 2018-02-15 RX ADMIN — MORPHINE SULFATE 2 MILLIGRAM(S): 50 CAPSULE, EXTENDED RELEASE ORAL at 10:57

## 2018-02-15 NOTE — H&P ADULT - NSHPPHYSICALEXAM_GEN_ALL_CORE
Physical Exam  Gen: NAD  HEENT: NC/AT, Mucosal Membranes  Cardio: S1/S2 No S3/S4, Regular  Resp: CTA B/L  Abdomen: Tender on palpation  Neuro: AAOx3, Cranial Nerve II-XII intact   Extremities: FROM x 4

## 2018-02-15 NOTE — H&P ADULT - NSHPREVIEWOFSYSTEMS_GEN_ALL_CORE
Review of Systems  General:  Denies Fatigue, Denies Fever, Denies Weakness ,Denies Weight Loss   HEENT: Denies Trouble Swallowing ,Denies  Sore Throat , Denies Change in hearing/vision/speech ,Denies Dizziness    Cardio: Denies  Chest Pain , Palpitations    Respiratory: Denies worsening of SOB, Denies Cough  Abdomen: See HPI  Neuro: Denies Headache, Denies Dizziness, Denies Paresthesias  [facility]: Denies pain in Bones/Joints/Muscles

## 2018-02-15 NOTE — H&P ADULT - HISTORY OF PRESENT ILLNESS
Patient is a 36 y/o with hisotry of Hypothyroid, recently admitted to [facility] for abdominal pain found to have an abdominal mass on imaging. Patient s/p EUS FNA with Bx being consistent with Adenocarcinoma. Patient notes ongoing abdominal pain since discharge despite Opiod treatment.

## 2018-02-15 NOTE — H&P ADULT - ASSESSMENT
Patient is a 38 y/o with hisotry of Hypothyroid, recently admitted to [facility] for abdominal pain found to have an abdominal mass on imaging. Patient s/p EUS FNA with Bx being consistent with Adenocarcinoma. Patient notes ongoing abdominal pain since discharge despite Opiod treatment.     1) Adenocarcinoma of the Pancreas  -EUS with Celiac plexus block today for pain control

## 2018-02-15 NOTE — ASU DISCHARGE PLAN (ADULT/PEDIATRIC). - NOTIFY
Excessive Diarrhea/Pain not relieved by Medications/Fever greater than 101/Bleeding that does not stop/Persistent Nausea and Vomiting

## 2018-02-16 LAB — NON-GYNECOLOGICAL CYTOLOGY STUDY: SIGNIFICANT CHANGE UP

## 2020-02-23 NOTE — PROGRESS NOTE ADULT - PROVIDER SPECIALTY LIST ADULT
Gastroenterology
Gastroenterology
Hospitalist
Hospitalist
Internal Medicine
Surgery
Surgery
Hospitalist
Internal Medicine
Gastroenterology
Hospitalist
Statement Selected
Statement Selected

## 2023-03-16 NOTE — PACU DISCHARGE NOTE - NSCLINEINSERTRD_GEN_ALL_CORE
Unable to tolerate bowel prep so colonoscopy cancelled  Abdomen soft, non-tender, non-distended  Hb this am 8.0 (stable)    IMPRESSION:  1) acute on chronic anemia  2) erosive gastritis  2) multiple medical co-morbidities as described previously     RECOMMENDATIONS:  1) colonoscopy cancelled  2) resume regular diet   3) PPI once daily  4) advised to call my office as outpatient to arrange coloscopy when able to tolerate prep & procedure    GI will sign off now- call if need to see again No
